# Patient Record
Sex: FEMALE | Race: WHITE | NOT HISPANIC OR LATINO | Employment: FULL TIME | URBAN - METROPOLITAN AREA
[De-identification: names, ages, dates, MRNs, and addresses within clinical notes are randomized per-mention and may not be internally consistent; named-entity substitution may affect disease eponyms.]

---

## 2024-04-09 ENCOUNTER — HOSPITAL ENCOUNTER (OUTPATIENT)
Dept: RADIOLOGY | Facility: HOSPITAL | Age: 34
Discharge: HOME | End: 2024-04-09
Attending: FAMILY MEDICINE
Payer: COMMERCIAL

## 2024-04-09 ENCOUNTER — TELEPHONE (OUTPATIENT)
Dept: URGENT CARE | Facility: CLINIC | Age: 34
End: 2024-04-09

## 2024-04-09 ENCOUNTER — OFFICE VISIT (OUTPATIENT)
Dept: URGENT CARE | Facility: CLINIC | Age: 34
End: 2024-04-09
Payer: COMMERCIAL

## 2024-04-09 VITALS
HEART RATE: 98 BPM | OXYGEN SATURATION: 98 % | RESPIRATION RATE: 20 BRPM | WEIGHT: 180 LBS | SYSTOLIC BLOOD PRESSURE: 120 MMHG | TEMPERATURE: 98 F | BODY MASS INDEX: 31.89 KG/M2 | HEIGHT: 63 IN | DIASTOLIC BLOOD PRESSURE: 83 MMHG

## 2024-04-09 DIAGNOSIS — Z87.42 HISTORY OF OVARIAN CYST: ICD-10-CM

## 2024-04-09 DIAGNOSIS — R10.30 LOWER ABDOMINAL PAIN: ICD-10-CM

## 2024-04-09 DIAGNOSIS — R10.30 LOWER ABDOMINAL PAIN: Primary | ICD-10-CM

## 2024-04-09 LAB
B-HCG UR QL: NEGATIVE
BILIRUB UR QL STRIP: NEGATIVE
CTP QC/QA: YES
GLUCOSE UR QL STRIP: NEGATIVE
KETONES UR QL STRIP: NEGATIVE
LEUKOCYTE ESTERASE UR QL STRIP: NEGATIVE
PH, POC UA: 7 (ref 5–8)
POC BLOOD, URINE: NEGATIVE
POC NITRATES, URINE: NEGATIVE
PROT UR QL STRIP: NEGATIVE
SP GR UR STRIP: 1.01 (ref 1–1.03)
UROBILINOGEN UR STRIP-ACNC: NORMAL (ref 0.1–1.1)

## 2024-04-09 PROCEDURE — 81025 URINE PREGNANCY TEST: CPT | Mod: S$GLB,,, | Performed by: FAMILY MEDICINE

## 2024-04-09 PROCEDURE — 81003 URINALYSIS AUTO W/O SCOPE: CPT | Mod: QW,S$GLB,, | Performed by: FAMILY MEDICINE

## 2024-04-09 PROCEDURE — 76856 US EXAM PELVIC COMPLETE: CPT | Mod: TC

## 2024-04-09 PROCEDURE — 99214 OFFICE O/P EST MOD 30 MIN: CPT | Mod: 25,S$GLB,, | Performed by: FAMILY MEDICINE

## 2024-04-09 PROCEDURE — 76830 TRANSVAGINAL US NON-OB: CPT | Mod: 26,,, | Performed by: RADIOLOGY

## 2024-04-09 PROCEDURE — 96372 THER/PROPH/DIAG INJ SC/IM: CPT | Mod: S$GLB,,, | Performed by: FAMILY MEDICINE

## 2024-04-09 PROCEDURE — 76856 US EXAM PELVIC COMPLETE: CPT | Mod: 26,,, | Performed by: RADIOLOGY

## 2024-04-09 RX ORDER — KETOROLAC TROMETHAMINE 30 MG/ML
30 INJECTION, SOLUTION INTRAMUSCULAR; INTRAVENOUS
Status: COMPLETED | OUTPATIENT
Start: 2024-04-09 | End: 2024-04-09

## 2024-04-09 RX ORDER — FEXOFENADINE HCL AND PSEUDOEPHEDRINE HCI 180; 240 MG/1; MG/1
1 TABLET, EXTENDED RELEASE ORAL DAILY
COMMUNITY

## 2024-04-09 RX ADMIN — KETOROLAC TROMETHAMINE 30 MG: 30 INJECTION, SOLUTION INTRAMUSCULAR; INTRAVENOUS at 10:04

## 2024-04-09 NOTE — TELEPHONE ENCOUNTER
Pt notified of pelvic US results. She is more uncomfortable after the US but is not n/v or chills. She has an appointment with the OBGN tomorrow and I encouraged her to follow with them for definite care. If she would get worse tonight then she is to proceed to the ER

## 2024-04-09 NOTE — PROGRESS NOTES
"Subjective:      Patient ID: Effie Govea is a 33 y.o. female.    Vitals:  height is 5' 3" (1.6 m) and weight is 81.6 kg (180 lb). Her oral temperature is 98.2 °F (36.8 °C). Her blood pressure is 120/83 and her pulse is 98. Her respiration is 20 and oxygen saturation is 98%.     Chief Complaint: Abdominal Pain    Patient presents with abdominal pain for 4 days. Progressively getting worse. Pain is located in the lower abdomen both right and left- worse on the right at the moment. She denies constipation or diarrhea or change in bowel habits. Denies nausea or vomiting or chills, etc. Pt states she has had the pain in the past many years ago when she had an ovarian cyst. She was on an IUD for a while but it got implanted in her uterus and she had to have it removed. She was then treated with OCs until last November when she moved to  and decided to stop the OCs due to other side effects. She has not been sexually active since February. She  Had very painful menses last month . She states in the past they thought perhaps she had endometriosis but it was not confirmed. Symptoms were controlled when on hormone therapy     Abdominal Pain  This is a new problem. Episode onset: 4 days. The onset quality is gradual. The problem occurs constantly. The problem has been gradually worsening. The pain is located in the generalized abdominal region, LLQ and LUQ (soreness to left side sharp pain to right). The pain is at a severity of 8/10. The pain is severe. The quality of the pain is sharp. The abdominal pain radiates to the RUQ and RLQ. Associated symptoms comments: No appetite. The pain is aggravated by movement. The pain is relieved by Nothing. Treatments tried: ibruprofen. The treatment provided no relief.       Gastrointestinal:  Positive for abdominal pain.      Objective:     Physical Exam   Constitutional: She is oriented to person, place, and time. No distress (until palpation on abdomen which causes acute severe pain)). "   HENT:   Head: Normocephalic.   Cardiovascular: Normal rate and regular rhythm.   Pulmonary/Chest: Effort normal and breath sounds normal.   Abdominal: Normal appearance and bowel sounds are normal. She exhibits no distension. Soft. flat abdomen There is abdominal tenderness (acute ttp in rt and lt LQ-worse on the rt). There is no rebound, no guarding, no left CVA tenderness and no right CVA tenderness.   Neurological: no focal deficit. She is alert and oriented to person, place, and time.   Skin: Skin is warm and dry.   Psychiatric: Her behavior is normal. Judgment and thought content normal.   Nursing note and vitals reviewed.      Assessment:     1. Lower abdominal pain    2. History of ovarian cyst        Plan:       Lower abdominal pain  -     POCT Urinalysis, Dipstick, Automated, W/O Scope  -     POCT urine pregnancy  -     ketorolac injection 30 mg  -     US Pelvis Complete Non OB; Future; Expected date: 04/09/2024    History of ovarian cyst    Pt or guardian provided educational materials and instructions regarding their visit diagnosis.

## 2024-04-10 ENCOUNTER — OFFICE VISIT (OUTPATIENT)
Dept: OBSTETRICS AND GYNECOLOGY | Facility: CLINIC | Age: 34
End: 2024-04-10
Payer: COMMERCIAL

## 2024-04-10 VITALS
DIASTOLIC BLOOD PRESSURE: 66 MMHG | WEIGHT: 181.44 LBS | SYSTOLIC BLOOD PRESSURE: 112 MMHG | HEIGHT: 63 IN | BODY MASS INDEX: 32.15 KG/M2

## 2024-04-10 DIAGNOSIS — N94.6 DYSMENORRHEA: Primary | ICD-10-CM

## 2024-04-10 DIAGNOSIS — R10.2 PELVIC PAIN: ICD-10-CM

## 2024-04-10 PROCEDURE — 3008F BODY MASS INDEX DOCD: CPT | Mod: CPTII,S$GLB,, | Performed by: OBSTETRICS & GYNECOLOGY

## 2024-04-10 PROCEDURE — 99203 OFFICE O/P NEW LOW 30 MIN: CPT | Mod: S$GLB,,, | Performed by: OBSTETRICS & GYNECOLOGY

## 2024-04-10 PROCEDURE — 1159F MED LIST DOCD IN RCRD: CPT | Mod: CPTII,S$GLB,, | Performed by: OBSTETRICS & GYNECOLOGY

## 2024-04-10 PROCEDURE — 3074F SYST BP LT 130 MM HG: CPT | Mod: CPTII,S$GLB,, | Performed by: OBSTETRICS & GYNECOLOGY

## 2024-04-10 PROCEDURE — 3078F DIAST BP <80 MM HG: CPT | Mod: CPTII,S$GLB,, | Performed by: OBSTETRICS & GYNECOLOGY

## 2024-04-10 PROCEDURE — 99999 PR PBB SHADOW E&M-EST. PATIENT-LVL III: CPT | Mod: PBBFAC,,, | Performed by: OBSTETRICS & GYNECOLOGY

## 2024-04-10 RX ORDER — LEVONORGESTREL / ETHINYL ESTRADIOL AND ETHINYL ESTRADIOL 150-30(84)
1 KIT ORAL DAILY
Qty: 84 EACH | Refills: 4 | Status: SHIPPED | OUTPATIENT
Start: 2024-04-10 | End: 2025-04-10

## 2024-04-10 NOTE — PROGRESS NOTES
Gynecology    SUBJECTIVE:     Chief Complaint: Pelvic Discomfort       History of Present Illness:  33 year old who presents with new pelvic pain.  Reports finishe dher period on 3/28 so was having cramps.  Then started to have severe pain in the middle of the night Sunday night.  She then stayed home Monday and took ibuprofen and laid around all day.  Used a heating pad.  She was having pain in general, but was also having worsening pain with her bowel movements as well.  Tuesday morning, her pain started to move toward the right hand side.  She then went to urgent care.  Ultrasound was performed and the patient was in tears during the abdominal portion with pressure on the right and vaginally.  Ultrasound results below.  Today, patient reports that the pain is improved, but she still has some tenderness with moving around.  Wearing loose work pants to avoid any pressure.      No fever or chills.  Has taken her temperature and it was normal.  No dysuria, urgency or frequency.  Normal bowel movements.  Decreased appetite but no nausea or vomiting.  Period was painful, but not out of the ordinary for her.  Typically her cycles lasts 4 days but the first two days are very painful and heavy with clots.  She has a history of endometriosis.      Of note, this type of painful event is not new for her.  It does not necessarily trend with her cycle.  Does have endometriosis diagnosed with laparoscopy at age 17.  Has been using contraception on and off for years.      Prior to covid, she tried several options to treat endometriosis, periods and blood clots.  Has also had ovarian cysts as well.  She did try mirena, but still had monthly cycles.  It improved somewhat with pain, but still had her period.  Had pain with removal (was told it was imbedded).  She also tried nuvaring and other OCPs and always had a period.  Has never tried menstrual suppression.      Has never had an abnormal pap smear.  Has had five colonoscopies  in the past to look for IBS.      Review of Systems:  Review of Systems   Constitutional:  Positive for appetite change. Negative for fever and unexpected weight change.   Gastrointestinal:  Positive for abdominal pain. Negative for constipation, diarrhea, nausea and vomiting.   Genitourinary:  Positive for dysmenorrhea and pelvic pain. Negative for dysuria, frequency, menorrhagia, menstrual problem, vaginal bleeding, vaginal discharge and vaginal pain.        OBJECTIVE:     Physical Exam:  Physical Exam  Vitals and nursing note reviewed.   Constitutional:       Appearance: She is well-developed.   Pulmonary:      Effort: Pulmonary effort is normal.   Abdominal:      Palpations: Abdomen is soft.   Genitourinary:     Labia:         Right: No rash, tenderness, lesion or injury.         Left: No rash, tenderness, lesion or injury.       Vagina: Normal. No signs of injury and foreign body. No vaginal discharge, erythema, tenderness or bleeding.      Cervix: No cervical motion tenderness, discharge or friability.      Uterus: Tender. Not deviated, not enlarged and not fixed.       Adnexa:         Right: No mass, tenderness or fullness.          Left: No mass, tenderness or fullness.        Comments: Urethra: normal appearing urethra with no masses, tenderness or lesions  Urethral meatus: normal size, anterior vaginal wall with no prolapse, no lesions  Neurological:      Mental Status: She is alert and oriented to person, place, and time.   Psychiatric:         Behavior: Behavior normal.         Thought Content: Thought content normal.         Judgment: Judgment normal.         Chaperoned by: Ruth    ASSESSMENT:       ICD-10-CM ICD-9-CM    1. Dysmenorrhea  N94.6 625.3 L norgest/e.estradioL-e.estrad (SEASONIQUE) 0.15 mg-30 mcg (84)/10 mcg (7) 3MPk      2. Pelvic pain  R10.2 [id number]              Plan:      Effie was seen today for pelvic discomfort.    Diagnoses and all orders for this visit:    Dysmenorrhea  -     L  norgest/e.estradioL-e.estrad (SEASONIQUE) 0.15 mg-30 mcg (84)/10 mcg (7) 3MPk; Take 1 tablet by mouth once daily. Skip placebo pills, take continuously.    Pelvic pain    Pelvic pain:  - unsure the cause, but it is improving, normal ultrasound, and low suspicion for PID on exam (no CMT); antibiotics not warranted at this time; no ovarian cyst or torsion; urine dipstick normal yesterday; patient encouraged to notify me if no improvement or worsening symptoms over the next few days.  If pain increases, could consider antibiotics.    Dysmenorrhea:  - discussed options to treat dysmenorrhea/endometriosis; patient not interested in another IUD; has never tried menstrual suppression; doesn't do well with NSAIDs due to an esophagus issue; will try continuous OCPs; discussed breakthrough bleeding and how to resolved;    No orders of the defined types were placed in this encounter.        Andreea Jay

## 2024-08-27 ENCOUNTER — OFFICE VISIT (OUTPATIENT)
Dept: URGENT CARE | Facility: CLINIC | Age: 34
End: 2024-08-27
Payer: COMMERCIAL

## 2024-08-27 VITALS
HEART RATE: 73 BPM | HEIGHT: 63 IN | DIASTOLIC BLOOD PRESSURE: 84 MMHG | SYSTOLIC BLOOD PRESSURE: 130 MMHG | BODY MASS INDEX: 31.89 KG/M2 | TEMPERATURE: 98 F | RESPIRATION RATE: 17 BRPM | OXYGEN SATURATION: 99 % | WEIGHT: 180 LBS

## 2024-08-27 DIAGNOSIS — R09.81 SINUS CONGESTION: ICD-10-CM

## 2024-08-27 DIAGNOSIS — U07.1 COVID-19: Primary | ICD-10-CM

## 2024-08-27 LAB
CTP QC/QA: YES
SARS-COV-2 AG RESP QL IA.RAPID: POSITIVE

## 2024-08-27 PROCEDURE — 87811 SARS-COV-2 COVID19 W/OPTIC: CPT | Mod: QW,S$GLB,, | Performed by: NURSE PRACTITIONER

## 2024-08-27 PROCEDURE — 99213 OFFICE O/P EST LOW 20 MIN: CPT | Mod: S$GLB,,, | Performed by: NURSE PRACTITIONER

## 2024-08-27 NOTE — LETTER
[address] [facility] [address]  Phone: [phone]  Fax: [phone]          Return to Work/School    Patient: Effie Govea  YOB: 1990   Date: 08/27/2024     To Whom It May Concern:     Effie Govea was in contact with/seen in my office on 08/27/2024. COVID-19 is present in our communities across the state.     Effie Govea has met the criteria for COVID-19 testing and has a POSITIVE result. She can return to work once they are asymptomatic for 24 hours without the use of fever reducing medications.   If you have any questions or concerns, or if I can be of further assistance, please do not hesitate to contact me.     Sincerely,    Janiya Miller, NP

## 2024-08-27 NOTE — PROGRESS NOTES
"Subjective:      Patient ID: Effie Govea is a 34 y.o. female.    Vitals:  height is 5' 3" (1.6 m) and weight is 81.6 kg (180 lb). Her tympanic temperature is 97.8 °F (36.6 °C). Her blood pressure is 130/84 and her pulse is 73. Her respiration is 17 and oxygen saturation is 99%.     Chief Complaint: Nasal Congestion (Entered by patient)    Patient presents with c.o sinus congestion for three days. Patient also with sinus pressure and cough. Denies chills, fever or body aches. Agrees to a covid test in clinic. Flonase has not helped much.     Provider note begins below    Ill contacts.  She works in a restaurant in hotel.  Denies any fevers aches.  Reports thick mucus.    Sinus Problem  This is a new problem. Episode onset: 3 days. The problem is unchanged. There has been no fever. Associated symptoms include congestion, coughing, headaches and sinus pressure. Pertinent negatives include no chills or diaphoresis. Treatments tried: ibuprofen, flonase. The treatment provided no relief.       Constitution: Negative for chills, sweating, fatigue and fever.   HENT:  Positive for congestion and sinus pressure.    Cardiovascular:  Negative for chest pain and sob on exertion.   Respiratory:  Positive for cough and sputum production.    Gastrointestinal:  Negative for nausea, vomiting, constipation and diarrhea.   Neurological:  Positive for headaches.      Objective:     Physical Exam   Constitutional: She is oriented to person, place, and time.   HENT:   Head: Normocephalic and atraumatic.   Cardiovascular: Normal rate and regular rhythm.   Pulmonary/Chest: Effort normal and breath sounds normal. No stridor. No respiratory distress. She has no wheezes. She has no rhonchi. She has no rales. She exhibits no tenderness.   Abdominal: Normal appearance.   Neurological: She is alert and oriented to person, place, and time.   Skin: Skin is warm and dry.   Psychiatric: Her behavior is normal. Mood normal.         Results for orders " placed or performed in visit on 08/27/24   SARS Coronavirus 2 Antigen, POCT Manual Read   Result Value Ref Range    SARS Coronavirus 2 Antigen Positive (A) Negative     Acceptable Yes       Assessment:     1. COVID-19    2. Sinus congestion        Plan:   You tested positive for COVID today     1     The CDC has changed their approach to COVID isolation to be in line with other respiratory viruses.         If you test positive for COVID-19 you may return to normal activities when, for at least 24 hours, both are true:     Your symptoms are getting better overall, and:  You have not had a fever AND are not using fever reducing medication    If you are ill with COVID-19, wear a mask for 10 days, where day 1 is when symptoms started. If you tested positive, but never had symptoms, day 1 is the date of positive test.     The CDC also recommends added precautions in the 5 days after return to normal activity including frequent hand washing, mask wearing, physical distancing.         COVID-19    Sinus congestion  -     SARS Coronavirus 2 Antigen, POCT Manual Read

## 2024-08-27 NOTE — PATIENT INSTRUCTIONS
The CDC has changed their approach to COVID isolation to be in line with other respiratory viruses.         If you test positive for COVID-19 you may return to normal activities when, for at least 24 hours, both are true:     Your symptoms are getting better overall, and:  You have not had a fever AND are not using fever reducing medication    If you are ill with COVID-19, wear a mask for 10 days, where day 1 is when symptoms started. If you tested positive, but never had symptoms, day 1 is the date of positive test.     The CDC also recommends added precautions in the 5 days after return to normal activity including frequent hand washing, mask wearing, physical distancing.     Mucinex for thick mucus and Sudafed for sinus congestion

## 2024-09-19 ENCOUNTER — OFFICE VISIT (OUTPATIENT)
Dept: OBSTETRICS AND GYNECOLOGY | Facility: CLINIC | Age: 34
End: 2024-09-19
Payer: COMMERCIAL

## 2024-09-19 ENCOUNTER — PATIENT MESSAGE (OUTPATIENT)
Dept: OBSTETRICS AND GYNECOLOGY | Facility: CLINIC | Age: 34
End: 2024-09-19

## 2024-09-19 VITALS
BODY MASS INDEX: 31.13 KG/M2 | DIASTOLIC BLOOD PRESSURE: 86 MMHG | SYSTOLIC BLOOD PRESSURE: 124 MMHG | WEIGHT: 175.69 LBS | HEIGHT: 63 IN

## 2024-09-19 DIAGNOSIS — N80.9 ENDOMETRIOSIS: Primary | ICD-10-CM

## 2024-09-19 PROCEDURE — 3074F SYST BP LT 130 MM HG: CPT | Mod: CPTII,S$GLB,, | Performed by: OBSTETRICS & GYNECOLOGY

## 2024-09-19 PROCEDURE — 1159F MED LIST DOCD IN RCRD: CPT | Mod: CPTII,S$GLB,, | Performed by: OBSTETRICS & GYNECOLOGY

## 2024-09-19 PROCEDURE — 99215 OFFICE O/P EST HI 40 MIN: CPT | Mod: S$GLB,,, | Performed by: OBSTETRICS & GYNECOLOGY

## 2024-09-19 PROCEDURE — 3008F BODY MASS INDEX DOCD: CPT | Mod: CPTII,S$GLB,, | Performed by: OBSTETRICS & GYNECOLOGY

## 2024-09-19 PROCEDURE — 3079F DIAST BP 80-89 MM HG: CPT | Mod: CPTII,S$GLB,, | Performed by: OBSTETRICS & GYNECOLOGY

## 2024-09-19 PROCEDURE — 99999 PR PBB SHADOW E&M-EST. PATIENT-LVL III: CPT | Mod: PBBFAC,,, | Performed by: OBSTETRICS & GYNECOLOGY

## 2024-09-19 NOTE — PROGRESS NOTES
Gynecology    SUBJECTIVE:     Chief Complaint: Dysmenorrhea       History of Present Illness:  34 year old who returns to discuss dysmenorrhea/endometriosis.  The patient was seen previously with severe pain with her period.  Her ultrasound was normal.  She was started on seasonale for menstrual suppression.     She started the pills in April.  She then had spotting in June and some funny discharge.  She stopped the pills for 7 days to have a period and then restarted.  She is currently on week 8 of her new pack.  She is having cramping, no bleeding.  She is having unusual discharge.  She reports this is occurring every few weeks.  She tried to go running this morning, but then had to stop because of lower abdominal discomfort.  She also has pain for two days after intercourse.      Does have regular bowel movements in the morning.  Has pain with bowel movements as well.  But her bowel movements are of normal consistency.      She previously used the IUD (removed one year ago, had issues with it implanting into the wall + partner had a vasectomy), then had no contraception for about 8 months.  During that time, her pain with the periods has gotten progressively worse.  Tends to be the week before her cycle and during her cycle.  Does have a history of laparoscpy that demonstrated endometriosis.    Review of Systems:  Review of Systems   Genitourinary:  Positive for dysmenorrhea and dyspareunia. Negative for menorrhagia, menstrual problem, pelvic pain, vaginal bleeding, vaginal discharge, vaginal pain and vaginal odor.        Pain with bowel movements        OBJECTIVE:     Physical Exam:  Physical Exam  Vitals and nursing note reviewed.   Constitutional:       Appearance: She is well-developed.   Pulmonary:      Effort: Pulmonary effort is normal.   Skin:     Coloration: Skin is not pale.   Neurological:      Mental Status: She is oriented to person, place, and time.   Psychiatric:         Behavior: Behavior normal.          Thought Content: Thought content normal.         Judgment: Judgment normal.         ASSESSMENT:       ICD-10-CM ICD-9-CM    1. Endometriosis  N80.9 617.9 elagolix 150 mg Tab             Plan:      Novel was seen today for dysmenorrhea.    Diagnoses and all orders for this visit:    Endometriosis  -     elagolix 150 mg Tab; Take 150 mg by mouth once daily.    41 minutes of total time was spent on the encounter which included face-to-face time and non-face-to-face time preparing to see the patient, obtaining and or reviewing   separately obtained history, documenting clinical information in the electronic or other health record, independently interpreting results (not separately reported) and   communicating results to the patient, or care coordination (not separately reported).    - patient has surgically diagnosed endometriosis and has failed continuous OCPs as well as IUD treatment; she doesn't tolerate NSAIDs due to GI upset  - discussed orlissa vs diagnostic laparoscopy vs hysterectomy  - patient is interested in orlissa; reviewed risks/benefits of the medication; advised to stop her current OCP; she is not sexually active, but advised to use contraception in the event that she becomes sexually active.   - follow up in 3 months to check on how she is doing.    No orders of the defined types were placed in this encounter.        Andreea Jay

## 2024-11-27 ENCOUNTER — PATIENT MESSAGE (OUTPATIENT)
Dept: OBSTETRICS AND GYNECOLOGY | Facility: CLINIC | Age: 34
End: 2024-11-27
Payer: COMMERCIAL

## 2025-03-26 ENCOUNTER — OFFICE VISIT (OUTPATIENT)
Dept: URGENT CARE | Facility: CLINIC | Age: 35
End: 2025-03-26
Payer: COMMERCIAL

## 2025-03-26 VITALS
BODY MASS INDEX: 31.01 KG/M2 | HEIGHT: 63 IN | SYSTOLIC BLOOD PRESSURE: 134 MMHG | DIASTOLIC BLOOD PRESSURE: 88 MMHG | RESPIRATION RATE: 20 BRPM | OXYGEN SATURATION: 99 % | WEIGHT: 175 LBS | HEART RATE: 84 BPM | TEMPERATURE: 98 F

## 2025-03-26 DIAGNOSIS — J02.9 SORE THROAT: ICD-10-CM

## 2025-03-26 DIAGNOSIS — J02.0 STREP PHARYNGITIS: Primary | ICD-10-CM

## 2025-03-26 DIAGNOSIS — R06.00 DYSPNEA, UNSPECIFIED TYPE: ICD-10-CM

## 2025-03-26 LAB
CTP QC/QA: YES
MOLECULAR STREP A: POSITIVE

## 2025-03-26 PROCEDURE — 96372 THER/PROPH/DIAG INJ SC/IM: CPT | Mod: S$GLB,,, | Performed by: NURSE PRACTITIONER

## 2025-03-26 PROCEDURE — 99213 OFFICE O/P EST LOW 20 MIN: CPT | Mod: 25,S$GLB,, | Performed by: NURSE PRACTITIONER

## 2025-03-26 PROCEDURE — 87651 STREP A DNA AMP PROBE: CPT | Mod: QW,S$GLB,, | Performed by: NURSE PRACTITIONER

## 2025-03-26 RX ORDER — DEXAMETHASONE SODIUM PHOSPHATE 10 MG/ML
10 INJECTION INTRAMUSCULAR; INTRAVENOUS
Status: COMPLETED | OUTPATIENT
Start: 2025-03-26 | End: 2025-03-26

## 2025-03-26 RX ORDER — AMOXICILLIN 500 MG/1
500 TABLET, FILM COATED ORAL EVERY 12 HOURS
Qty: 20 TABLET | Refills: 0 | Status: SHIPPED | OUTPATIENT
Start: 2025-03-26 | End: 2025-04-05

## 2025-03-26 RX ADMIN — DEXAMETHASONE SODIUM PHOSPHATE 10 MG: 10 INJECTION INTRAMUSCULAR; INTRAVENOUS at 09:03

## 2025-03-26 NOTE — PROGRESS NOTES
"Subjective:      Patient ID: Effie Govea is a 34 y.o. female.    Vitals:  height is 5' 3" (1.6 m) and weight is 79.4 kg (175 lb). Her temperature is 98.1 °F (36.7 °C). Her blood pressure is 134/88 and her pulse is 84. Her respiration is 20 and oxygen saturation is 99%.     Chief Complaint: Sore Throat (Entered by patient)    Bryan is a 33 yo female presenting with complaint of sore throat, trouble breathing (related to throat).  Onset was 2-3 days ago.  Pt states she has had a sore throat since the beginning of the week and states as of last night she has been having issues breathing.  Pt states when she lays down her symptoms worsen.  Pt states she has hx of strep throat and allergies.  Pt states she has taken ibuprofen and allegra for her symptoms     Sore Throat   This is a new problem. The current episode started in the past 7 days. The problem has been gradually worsening. Neither side of throat is experiencing more pain than the other. There has been no fever. The pain is at a severity of 8/10. The pain is moderate. Associated symptoms include shortness of breath (feels like throat is swollen shut when she lays down). Pertinent negatives include no congestion, coughing, diarrhea, ear pain, headaches, trouble swallowing or vomiting. She has had no exposure to strep or mono. She has tried NSAIDs for the symptoms. The treatment provided mild relief.       Constitution: Negative for chills, fatigue and fever.   HENT:  Positive for sore throat. Negative for ear pain, congestion, sinus pain and trouble swallowing.    Cardiovascular:  Negative for chest pain.   Respiratory:  Positive for shortness of breath (feels like throat is swollen shut when she lays down). Negative for cough and sputum production.    Gastrointestinal:  Negative for nausea, vomiting and diarrhea.   Musculoskeletal:  Negative for muscle ache.   Neurological:  Negative for headaches.      Objective:     Physical Exam   Constitutional: She is oriented " to person, place, and time.   HENT:   Head: Normocephalic.   Ears:   Right Ear: Hearing and external ear normal. No no drainage, swelling or tenderness. Tympanic membrane is not erythematous, not retracted and not bulging. No middle ear effusion.   Left Ear: Hearing and external ear normal. No no drainage, swelling or tenderness. Tympanic membrane is not erythematous, not retracted and not bulging.  No middle ear effusion.   Nose: Nose normal. No mucosal edema, rhinorrhea or purulent discharge. Right sinus exhibits no maxillary sinus tenderness and no frontal sinus tenderness. Left sinus exhibits no maxillary sinus tenderness and no frontal sinus tenderness.   Mouth/Throat: Uvula is midline and mucous membranes are normal. No trismus in the jaw. No uvula swelling. Posterior oropharyngeal edema and posterior oropharyngeal erythema present. No oropharyngeal exudate. Tonsils are 3+ on the right. Tonsils are 3+ on the left. No tonsillar exudate.   Cardiovascular: Normal rate and regular rhythm.   Pulmonary/Chest: Effort normal and breath sounds normal. No accessory muscle usage or stridor. No respiratory distress. She has no decreased breath sounds. She has no wheezes. She has no rhonchi. She has no rales.   Neurological: She is alert and oriented to person, place, and time.   Skin: Skin is warm and dry.   Nursing note and vitals reviewed.      Assessment:     1. Strep pharyngitis    2. Sore throat    3. Dyspnea, unspecified type        Plan:       Strep pharyngitis  -     amoxicillin (AMOXIL) 500 MG Tab; Take 1 tablet (500 mg total) by mouth every 12 (twelve) hours. for 10 days  Dispense: 20 tablet; Refill: 0    Sore throat  -     POCT Strep A, Molecular    Dyspnea, unspecified type  -     dexAMETHasone injection 10 mg      Patient Instructions     Sore throat  -     POCT Strep A, Molecular    Dyspnea, unspecified type  -     dexAMETHasone injection 10 mg - given in clinic @ 9:20 pm    Strep pharyngitis    -      "amoxicillin (AMOXIL) 500 MG Tab; Take 1 tablet (500 mg total) by mouth every 12 (twelve) hours. for 10 days  Dispense: 20 tablet; Refill: 0      You have had a positive test for strep throat. Strep throat is a contagious illness. It is spread by coughing, kissing or by touching others after touching your mouth or nose. Symptoms include throat pain that is worse with swallowing, aching all over, headache, and fever. It is treated with antibiotic medicine. This should help you start to feel better in 1 to 2 days.  Take antibiotic medicine for the full 10 days, even if you feel better. This is very important to ensure the infection is treated. It is also important to prevent medicine-resistant germs from developing.        - Change toothbrush after resolution of symptoms and completion of antibiotic therapy    - Rest at home.     - Drink plenty of fluids so you won't get dehydrated.    - Fever/Pain recommendations:  Alternate Tylenol or Motrin every 4 - 6 hours as needed for fever, pain or fussiness.    -Sore throat recommendations: Warm fluids, soup, or drinking something cold or frozen.    -Congestion recommendations: Over-the-counter Children's Mucinex or over the counter Saline Nasal Spray or Flonase Kids as directed for nasal congestion.  Saline Nasal Spray with bulb suction as needed for nasal congestion; perform during the day and especially before eating and bedtime    -Cough recommendations: Over-the-counter Children's Delsym       Avoid taking Decongestants such as pseudoephedrine (ex. Sudafed) or phenylephrine (ex. Mucinex FastMax, Dayquil, Nyquil, or any combo cold meds that say "cold," "sinus" or "-D").        Returning to work/school:  No work or school for the first 2 days of taking the antibiotics. After this time, you will not be contagious. You can then return to school or work if you are feeling better.     Follow up with your Pediatrician in the next 48hrs or sooner for re-eval especially if no " improvement in symptoms    When to seek medical advice  Call your healthcare provider right away if any of these occur:  Fever that is poorly controlled with OTC fever reducing medication  New or worsening ear pain, sinus pain, or headache  Stiff neck  You can't swallow liquids or you can't open your mouth wide because of throat pain  Signs of dehydration. These include very dark urine or no urine, sunken eyes, and dizziness.  Trouble breathing or noisy breathing  Muffled voice  Rash

## 2025-03-26 NOTE — PATIENT INSTRUCTIONS
"  Sore throat  -     POCT Strep A, Molecular    Dyspnea, unspecified type  -     dexAMETHasone injection 10 mg - given in clinic @ 9:20 pm    Strep pharyngitis    -     amoxicillin (AMOXIL) 500 MG Tab; Take 1 tablet (500 mg total) by mouth every 12 (twelve) hours. for 10 days  Dispense: 20 tablet; Refill: 0      You have had a positive test for strep throat. Strep throat is a contagious illness. It is spread by coughing, kissing or by touching others after touching your mouth or nose. Symptoms include throat pain that is worse with swallowing, aching all over, headache, and fever. It is treated with antibiotic medicine. This should help you start to feel better in 1 to 2 days.  Take antibiotic medicine for the full 10 days, even if you feel better. This is very important to ensure the infection is treated. It is also important to prevent medicine-resistant germs from developing.        - Change toothbrush after resolution of symptoms and completion of antibiotic therapy    - Rest at home.     - Drink plenty of fluids so you won't get dehydrated.    - Fever/Pain recommendations:  Alternate Tylenol or Motrin every 4 - 6 hours as needed for fever, pain or fussiness.    -Sore throat recommendations: Warm fluids, soup, or drinking something cold or frozen.    -Congestion recommendations: Over-the-counter Children's Mucinex or over the counter Saline Nasal Spray or Flonase Kids as directed for nasal congestion.  Saline Nasal Spray with bulb suction as needed for nasal congestion; perform during the day and especially before eating and bedtime    -Cough recommendations: Over-the-counter Children's Delsym       Avoid taking Decongestants such as pseudoephedrine (ex. Sudafed) or phenylephrine (ex. Mucinex FastMax, Dayquil, Nyquil, or any combo cold meds that say "cold," "sinus" or "-D").        Returning to work/school:  No work or school for the first 2 days of taking the antibiotics. After this time, you will not be " contagious. You can then return to school or work if you are feeling better.     Follow up with your Pediatrician in the next 48hrs or sooner for re-eval especially if no improvement in symptoms    When to seek medical advice  Call your healthcare provider right away if any of these occur:  Fever that is poorly controlled with OTC fever reducing medication  New or worsening ear pain, sinus pain, or headache  Stiff neck  You can't swallow liquids or you can't open your mouth wide because of throat pain  Signs of dehydration. These include very dark urine or no urine, sunken eyes, and dizziness.  Trouble breathing or noisy breathing  Muffled voice  Rash

## 2025-04-20 ENCOUNTER — HOSPITAL ENCOUNTER (OUTPATIENT)
Facility: OTHER | Age: 35
Discharge: HOME | End: 2025-04-22
Attending: EMERGENCY MEDICINE | Admitting: EMERGENCY MEDICINE
Payer: COMMERCIAL

## 2025-04-20 DIAGNOSIS — K52.9 ACUTE COLITIS: Primary | ICD-10-CM

## 2025-04-20 DIAGNOSIS — R10.31 RIGHT LOWER QUADRANT ABDOMINAL PAIN: ICD-10-CM

## 2025-04-20 DIAGNOSIS — K52.9 COLITIS: ICD-10-CM

## 2025-04-20 PROBLEM — K51.00 PANCOLITIS: Status: ACTIVE | Noted: 2025-04-20

## 2025-04-20 LAB
ABSOLUTE EOSINOPHIL (OHS): 0.01 K/UL
ABSOLUTE MONOCYTE (OHS): 0.8 K/UL (ref 0.3–1)
ABSOLUTE NEUTROPHIL COUNT (OHS): 8.89 K/UL (ref 1.8–7.7)
ALBUMIN SERPL BCP-MCNC: 3.5 G/DL (ref 3.5–5.2)
ALP SERPL-CCNC: 93 UNIT/L (ref 40–150)
ALT SERPL W/O P-5'-P-CCNC: 16 UNIT/L (ref 10–44)
ANION GAP (OHS): 7 MMOL/L (ref 8–16)
AST SERPL-CCNC: 16 UNIT/L (ref 11–45)
B-HCG UR QL: NEGATIVE
BASOPHILS # BLD AUTO: 0.02 K/UL
BASOPHILS NFR BLD AUTO: 0.2 %
BILIRUB SERPL-MCNC: 0.3 MG/DL (ref 0.1–1)
BILIRUB UR QL STRIP.AUTO: ABNORMAL
BUN SERPL-MCNC: 6 MG/DL (ref 6–20)
CALCIUM SERPL-MCNC: 8.5 MG/DL (ref 8.7–10.5)
CHLORIDE SERPL-SCNC: 106 MMOL/L (ref 95–110)
CLARITY UR: CLEAR
CO2 SERPL-SCNC: 21 MMOL/L (ref 23–29)
COLOR UR AUTO: YELLOW
CREAT SERPL-MCNC: 0.8 MG/DL (ref 0.5–1.4)
CTP QC/QA: YES
ERYTHROCYTE [DISTWIDTH] IN BLOOD BY AUTOMATED COUNT: 17 % (ref 11.5–14.5)
GFR SERPLBLD CREATININE-BSD FMLA CKD-EPI: >60 ML/MIN/1.73/M2
GLUCOSE SERPL-MCNC: 114 MG/DL (ref 70–110)
GLUCOSE UR QL STRIP: NEGATIVE
HCT VFR BLD AUTO: 35.8 % (ref 37–48.5)
HGB BLD-MCNC: 11.9 GM/DL (ref 12–16)
HGB UR QL STRIP: ABNORMAL
HOLD SPECIMEN: NORMAL
IMM GRANULOCYTES # BLD AUTO: 0.03 K/UL (ref 0–0.04)
IMM GRANULOCYTES NFR BLD AUTO: 0.3 % (ref 0–0.5)
KETONES UR QL STRIP: ABNORMAL
LEUKOCYTE ESTERASE UR QL STRIP: NEGATIVE
LIPASE SERPL-CCNC: 63 U/L (ref 4–60)
LYMPHOCYTES # BLD AUTO: 1.09 K/UL (ref 1–4.8)
MCH RBC QN AUTO: 28.4 PG (ref 27–31)
MCHC RBC AUTO-ENTMCNC: 33.2 G/DL (ref 32–36)
MCV RBC AUTO: 85 FL (ref 82–98)
NITRITE UR QL STRIP: NEGATIVE
NUCLEATED RBC (/100WBC) (OHS): 0 /100 WBC
PH UR STRIP: 6 [PH]
PLATELET # BLD AUTO: 213 K/UL (ref 150–450)
PMV BLD AUTO: 10.1 FL (ref 9.2–12.9)
POTASSIUM SERPL-SCNC: 3.3 MMOL/L (ref 3.5–5.1)
PROT SERPL-MCNC: 7.2 GM/DL (ref 6–8.4)
PROT UR QL STRIP: ABNORMAL
RBC # BLD AUTO: 4.19 M/UL (ref 4–5.4)
RELATIVE EOSINOPHIL (OHS): 0.1 %
RELATIVE LYMPHOCYTE (OHS): 10.1 % (ref 18–48)
RELATIVE MONOCYTE (OHS): 7.4 % (ref 4–15)
RELATIVE NEUTROPHIL (OHS): 81.9 % (ref 38–73)
SODIUM SERPL-SCNC: 134 MMOL/L (ref 136–145)
SP GR UR STRIP: >=1.03
UROBILINOGEN UR STRIP-ACNC: NEGATIVE EU/DL
WBC # BLD AUTO: 10.84 K/UL (ref 3.9–12.7)

## 2025-04-20 PROCEDURE — G0378 HOSPITAL OBSERVATION PER HR: HCPCS

## 2025-04-20 PROCEDURE — 25000003 PHARM REV CODE 250

## 2025-04-20 PROCEDURE — 85025 COMPLETE CBC W/AUTO DIFF WBC: CPT | Performed by: EMERGENCY MEDICINE

## 2025-04-20 PROCEDURE — 25500020 PHARM REV CODE 255: Performed by: EMERGENCY MEDICINE

## 2025-04-20 PROCEDURE — 83690 ASSAY OF LIPASE: CPT | Performed by: EMERGENCY MEDICINE

## 2025-04-20 PROCEDURE — 80053 COMPREHEN METABOLIC PANEL: CPT | Performed by: EMERGENCY MEDICINE

## 2025-04-20 PROCEDURE — 81003 URINALYSIS AUTO W/O SCOPE: CPT | Performed by: EMERGENCY MEDICINE

## 2025-04-20 PROCEDURE — 25000003 PHARM REV CODE 250: Performed by: EMERGENCY MEDICINE

## 2025-04-20 PROCEDURE — 87324 CLOSTRIDIUM AG IA: CPT | Performed by: EMERGENCY MEDICINE

## 2025-04-20 PROCEDURE — 81025 URINE PREGNANCY TEST: CPT | Performed by: EMERGENCY MEDICINE

## 2025-04-20 PROCEDURE — 63600175 PHARM REV CODE 636 W HCPCS: Mod: JZ,TB

## 2025-04-20 PROCEDURE — 63600175 PHARM REV CODE 636 W HCPCS: Performed by: EMERGENCY MEDICINE

## 2025-04-20 PROCEDURE — 63600175 PHARM REV CODE 636 W HCPCS: Mod: JZ,TB | Performed by: EMERGENCY MEDICINE

## 2025-04-20 PROCEDURE — 63600175 PHARM REV CODE 636 W HCPCS: Performed by: NURSE PRACTITIONER

## 2025-04-20 RX ORDER — CIPROFLOXACIN 2 MG/ML
400 INJECTION, SOLUTION INTRAVENOUS
Status: DISCONTINUED | OUTPATIENT
Start: 2025-04-20 | End: 2025-04-22 | Stop reason: HOSPADM

## 2025-04-20 RX ORDER — KETOROLAC TROMETHAMINE 30 MG/ML
10 INJECTION, SOLUTION INTRAMUSCULAR; INTRAVENOUS
Status: COMPLETED | OUTPATIENT
Start: 2025-04-20 | End: 2025-04-20

## 2025-04-20 RX ORDER — ACETAMINOPHEN 500 MG
1000 TABLET ORAL
Status: COMPLETED | OUTPATIENT
Start: 2025-04-20 | End: 2025-04-20

## 2025-04-20 RX ORDER — CIPROFLOXACIN 2 MG/ML
400 INJECTION, SOLUTION INTRAVENOUS
Status: COMPLETED | OUTPATIENT
Start: 2025-04-20 | End: 2025-04-20

## 2025-04-20 RX ORDER — POTASSIUM CHLORIDE 20 MEQ/1
40 TABLET, EXTENDED RELEASE ORAL
Status: ACTIVE | OUTPATIENT
Start: 2025-04-20 | End: 2025-04-21

## 2025-04-20 RX ORDER — METRONIDAZOLE 500 MG/100ML
500 INJECTION, SOLUTION INTRAVENOUS
Status: DISCONTINUED | OUTPATIENT
Start: 2025-04-20 | End: 2025-04-22 | Stop reason: HOSPADM

## 2025-04-20 RX ORDER — LOPERAMIDE HYDROCHLORIDE 2 MG/1
4 CAPSULE ORAL
Status: COMPLETED | OUTPATIENT
Start: 2025-04-20 | End: 2025-04-20

## 2025-04-20 RX ORDER — ONDANSETRON HYDROCHLORIDE 2 MG/ML
4 INJECTION, SOLUTION INTRAVENOUS EVERY 8 HOURS PRN
Status: DISCONTINUED | OUTPATIENT
Start: 2025-04-20 | End: 2025-04-22 | Stop reason: HOSPADM

## 2025-04-20 RX ORDER — ONDANSETRON HYDROCHLORIDE 2 MG/ML
4 INJECTION, SOLUTION INTRAVENOUS
Status: COMPLETED | OUTPATIENT
Start: 2025-04-20 | End: 2025-04-20

## 2025-04-20 RX ORDER — PROCHLORPERAZINE EDISYLATE 5 MG/ML
5 INJECTION INTRAMUSCULAR; INTRAVENOUS EVERY 6 HOURS PRN
Status: DISCONTINUED | OUTPATIENT
Start: 2025-04-20 | End: 2025-04-22 | Stop reason: HOSPADM

## 2025-04-20 RX ORDER — SODIUM CHLORIDE, SODIUM LACTATE, POTASSIUM CHLORIDE, CALCIUM CHLORIDE 600; 310; 30; 20 MG/100ML; MG/100ML; MG/100ML; MG/100ML
INJECTION, SOLUTION INTRAVENOUS CONTINUOUS
Status: DISCONTINUED | OUTPATIENT
Start: 2025-04-20 | End: 2025-04-22

## 2025-04-20 RX ORDER — TALC
6 POWDER (GRAM) TOPICAL NIGHTLY PRN
Status: DISCONTINUED | OUTPATIENT
Start: 2025-04-20 | End: 2025-04-22 | Stop reason: HOSPADM

## 2025-04-20 RX ORDER — MORPHINE SULFATE 4 MG/ML
4 INJECTION, SOLUTION INTRAMUSCULAR; INTRAVENOUS EVERY 4 HOURS PRN
Refills: 0 | Status: DISCONTINUED | OUTPATIENT
Start: 2025-04-20 | End: 2025-04-22 | Stop reason: HOSPADM

## 2025-04-20 RX ORDER — SODIUM CHLORIDE 0.9 % (FLUSH) 0.9 %
10 SYRINGE (ML) INJECTION
Status: DISCONTINUED | OUTPATIENT
Start: 2025-04-20 | End: 2025-04-22 | Stop reason: HOSPADM

## 2025-04-20 RX ORDER — METRONIDAZOLE 500 MG/100ML
500 INJECTION, SOLUTION INTRAVENOUS
Status: COMPLETED | OUTPATIENT
Start: 2025-04-20 | End: 2025-04-20

## 2025-04-20 RX ORDER — MORPHINE SULFATE 4 MG/ML
4 INJECTION, SOLUTION INTRAMUSCULAR; INTRAVENOUS
Refills: 0 | Status: COMPLETED | OUTPATIENT
Start: 2025-04-20 | End: 2025-04-20

## 2025-04-20 RX ORDER — OXYCODONE HYDROCHLORIDE 5 MG/1
5 TABLET ORAL EVERY 4 HOURS PRN
Refills: 0 | Status: DISCONTINUED | OUTPATIENT
Start: 2025-04-20 | End: 2025-04-22 | Stop reason: HOSPADM

## 2025-04-20 RX ORDER — SODIUM CHLORIDE 9 MG/ML
INJECTION, SOLUTION INTRAVENOUS CONTINUOUS
Status: DISCONTINUED | OUTPATIENT
Start: 2025-04-20 | End: 2025-04-20

## 2025-04-20 RX ORDER — POTASSIUM CHLORIDE 7.45 MG/ML
10 INJECTION INTRAVENOUS
Status: COMPLETED | OUTPATIENT
Start: 2025-04-20 | End: 2025-04-20

## 2025-04-20 RX ADMIN — CIPROFLOXACIN 400 MG: 2 INJECTION, SOLUTION INTRAVENOUS at 05:04

## 2025-04-20 RX ADMIN — LOPERAMIDE HYDROCHLORIDE 4 MG: 2 CAPSULE ORAL at 07:04

## 2025-04-20 RX ADMIN — KETOROLAC TROMETHAMINE 10 MG: 30 INJECTION, SOLUTION INTRAMUSCULAR; INTRAVENOUS at 07:04

## 2025-04-20 RX ADMIN — IOHEXOL 75 ML: 350 INJECTION, SOLUTION INTRAVENOUS at 03:04

## 2025-04-20 RX ADMIN — METRONIDAZOLE 500 MG: 5 INJECTION, SOLUTION INTRAVENOUS at 07:04

## 2025-04-20 RX ADMIN — OXYCODONE HYDROCHLORIDE 5 MG: 5 TABLET ORAL at 07:04

## 2025-04-20 RX ADMIN — SODIUM CHLORIDE, POTASSIUM CHLORIDE, SODIUM LACTATE AND CALCIUM CHLORIDE: 600; 310; 30; 20 INJECTION, SOLUTION INTRAVENOUS at 11:04

## 2025-04-20 RX ADMIN — SODIUM CHLORIDE 1000 ML: 9 INJECTION, SOLUTION INTRAVENOUS at 02:04

## 2025-04-20 RX ADMIN — METRONIDAZOLE 500 MG: 5 INJECTION, SOLUTION INTRAVENOUS at 11:04

## 2025-04-20 RX ADMIN — ONDANSETRON 4 MG: 2 INJECTION INTRAMUSCULAR; INTRAVENOUS at 02:04

## 2025-04-20 RX ADMIN — MORPHINE SULFATE 4 MG: 4 INJECTION INTRAVENOUS at 02:04

## 2025-04-20 RX ADMIN — METRONIDAZOLE 500 MG: 5 INJECTION, SOLUTION INTRAVENOUS at 04:04

## 2025-04-20 RX ADMIN — ONDANSETRON 4 MG: 2 INJECTION INTRAMUSCULAR; INTRAVENOUS at 06:04

## 2025-04-20 RX ADMIN — SODIUM CHLORIDE: 9 INJECTION, SOLUTION INTRAVENOUS at 07:04

## 2025-04-20 RX ADMIN — ACETAMINOPHEN 1000 MG: 500 TABLET ORAL at 01:04

## 2025-04-20 RX ADMIN — POTASSIUM CHLORIDE 10 MEQ: 7.46 INJECTION, SOLUTION INTRAVENOUS at 02:04

## 2025-04-20 RX ADMIN — SODIUM CHLORIDE, POTASSIUM CHLORIDE, SODIUM LACTATE AND CALCIUM CHLORIDE: 600; 310; 30; 20 INJECTION, SOLUTION INTRAVENOUS at 09:04

## 2025-04-20 NOTE — ED PROVIDER NOTES
Encounter Date: 4/20/2025    SCRIBE #1 NOTE: I, Reg Bull, am scribing for, and in the presence of,  Dylan Unger MD. I have scribed the following portions of the note - Other sections scribed: HPI, ROS, PE.       History     Chief Complaint   Patient presents with    Abdominal Pain     RLQ pain 9/10 for 1 day, endorses fever and chills at home, also nausea and diarrhea.  Previous ABD surgeries       This is a 34 y.o. female with history of endometriosis who presents with complaint of progressively worsening abdominal pain since yesterday morning. She describes the pain as cramping and it is focused in her right lower quadrant. She reports at least 10 episodes of diarrhea, as well as intensifying pain with these bowel movements.  She also has had nausea all day and started vomiting soon after ED arrival.  She started her menstrual cycle yesterday but notes that this pain is more severe than her menstrual cramps. She also reports fever (102) and chills. She also does not normally experience diarrhea or fever during her cycle. Her periods are regular and her pain has been improved since starting OCPs. She denies eating any unusual foods and has low concern for food poisoning. She denies dysuria, urinary frequency, vaginal discharge, or concern for STD. She denies cough or cold symptoms. Her surgical history includes cholecystomy. This is the extent of the patient's complaints at this time.    The history is provided by the patient. No  was used.     Review of patient's allergies indicates:  No Known Allergies  Past Medical History:   Diagnosis Date    Allergy      Past Surgical History:   Procedure Laterality Date    GALLBLADDER SURGERY  2008    laproscopy  2007     Family History   Problem Relation Name Age of Onset    Breast cancer Maternal Grandmother      Hypertension Mother      Osteoporosis Mother      Breast cancer Other  62        anut on dads side     Social History[1]  Review  of Systems   Constitutional:  Positive for chills and fever.   HENT:  Negative for congestion.    Eyes:  Negative for redness.   Respiratory:  Negative for cough and shortness of breath.    Cardiovascular:  Negative for chest pain.   Gastrointestinal:  Positive for abdominal pain, diarrhea, nausea and vomiting.   Genitourinary:  Negative for dysuria, frequency and vaginal discharge.   Skin:  Negative for rash.   Neurological:  Negative for headaches.   Psychiatric/Behavioral:  Negative for confusion.        Physical Exam     Initial Vitals   BP Pulse Resp Temp SpO2   04/20/25 0200 04/20/25 0121 04/20/25 0121 04/20/25 0121 04/20/25 0121   139/68 103 19 98.6 °F (37 °C) 100 %      MAP       --                Physical Exam    Constitutional: She appears well-developed and well-nourished. She is not diaphoretic. No distress.   HENT:   Head: Normocephalic and atraumatic.   Dry mucous membranes   Eyes: Conjunctivae are normal.   Neck: Neck supple.   Cardiovascular:  Regular rhythm, S1 normal, S2 normal and intact distal pulses.   Tachycardia present.         2/6 systolic murmur   Pulmonary/Chest: Breath sounds normal. No respiratory distress. She has no wheezes. She has no rhonchi. She has no rales.   Abdominal: Abdomen is soft. There is abdominal tenderness.   Diffuse right lower quadrant tenderness There is no rebound and no guarding.   Musculoskeletal:         General: No edema.      Cervical back: Neck supple.     Neurological: She is alert and oriented to person, place, and time.   Skin: Skin is warm and dry.   Psychiatric: She has a normal mood and affect.         ED Course   Procedures  Labs Reviewed   COMPREHENSIVE METABOLIC PANEL - Abnormal       Result Value    Sodium 134 (*)     Potassium 3.3 (*)     Chloride 106      CO2 21 (*)     Glucose 114 (*)     BUN 6      Creatinine 0.8      Calcium 8.5 (*)     Protein Total 7.2      Albumin 3.5      Bilirubin Total 0.3      ALP 93      AST 16      ALT 16      Anion Gap  7 (*)     eGFR >60     LIPASE - Abnormal    Lipase Level 63 (*)    URINALYSIS, REFLEX TO URINE CULTURE - Abnormal    Color, UA Yellow      Appearance, UA Clear      pH, UA 6.0      Spec Grav UA >=1.030 (*)     Protein, UA Trace (*)     Glucose, UA Negative      Ketones, UA Trace (*)     Bilirubin, UA 1+ (*)     Blood, UA Trace (*)     Nitrites, UA Negative      Urobilinogen, UA Negative      Leukocyte Esterase, UA Negative     CBC WITH DIFFERENTIAL - Abnormal    WBC 10.84      RBC 4.19      HGB 11.9 (*)     HCT 35.8 (*)     MCV 85      MCH 28.4      MCHC 33.2      RDW 17.0 (*)     Platelet Count 213      MPV 10.1      Nucleated RBC 0      Neut % 81.9 (*)     Lymph % 10.1 (*)     Mono % 7.4      Eos % 0.1      Basophil % 0.2      Imm Grans % 0.3      Neut # 8.89 (*)     Lymph # 1.09      Mono # 0.80      Eos # 0.01      Baso # 0.02      Imm Grans # 0.03     POCT URINE PREGNANCY - Normal    POC Preg Test, Ur Negative       Acceptable Yes     CLOSTRIDIUM DIFFICILE   CBC W/ AUTO DIFFERENTIAL    Narrative:     The following orders were created for panel order CBC auto differential.  Procedure                               Abnormality         Status                     ---------                               -----------         ------                     CBC with Differential[[id number]]       Abnormal            Final result                 Please view results for these tests on the individual orders.   GREY TOP URINE HOLD    Extra Tube Hold for add-ons.            Imaging Results              CT Abdomen Pelvis With IV Contrast NO Oral Contrast (Final result)  Result time 04/20/25 04:56:37      Final result by Adair Harris MD (04/20/25 04:56:37)                   Impression:      Diffuse colonic wall thickening with mild mucosal enhancement suggesting pancolitis.  Mildly enlarged lymph nodes in the mesentery, likely reactive.  Findings are more pronounced on the right side of the colon compared to  the left.    Prior cholecystectomy.      Electronically signed by: Adair Harris MD  Date:    04/20/2025  Time:    04:56               Narrative:    EXAMINATION:  CT ABDOMEN PELVIS WITH IV CONTRAST    CLINICAL HISTORY:  RLQ abdominal pain (Age >= 14y);    TECHNIQUE:  Low dose axial images, sagittal and coronal reformations were obtained from the lung bases to the pubic symphysis following the IV administration of 75 mL of Omnipaque 350 .  Oral contrast was not administered.    COMPARISON:  None.    FINDINGS:  Abdomen:    - Lower thorax:Unremarkable.    - Lung bases: No infiltrates and no nodules.    - Liver: No focal mass.    - Gallbladder: Surgically absent.    - Bile Ducts: No evidence of intra or extra hepatic biliary ductal dilation.    - Spleen: Negative.    - Kidneys: No mass or hydronephrosis.    - Adrenals: Unremarkable.    - Pancreas: No mass or peripancreatic fat stranding.    - Retroperitoneum:  No significant adenopathy.    - Vascular: No abdominal aortic aneurysm.    - Abdominal wall:  Small fat containing umbilical hernia.    Pelvis:    No pelvic mass, adenopathy, or free fluid.    Bowel/Mesentery:    Small hiatal hernia.  No small bowel obstruction.  Diffuse colonic wall thickening with mild mucosal enhancement suggesting pancolitis.  Findings more pronounced on the right side.  Normal appendix.  Prominent lymph nodes in the mesentery, likely reactive.    Bones:  No acute osseous abnormality and no suspicious lytic or blastic lesion.                                       Medications   ciprofloxacin (CIPRO)400mg/200ml D5W IVPB 400 mg (400 mg Intravenous New Bag 4/20/25 0556)   metronidazole IVPB 500 mg (has no administration in time range)   sodium chloride 0.9% flush 10 mL (has no administration in time range)   melatonin tablet 6 mg (has no administration in time range)   oxyCODONE immediate release tablet 5 mg (has no administration in time range)   morphine injection 4 mg (has no administration  in time range)   ondansetron injection 4 mg (has no administration in time range)   prochlorperazine injection Soln 5 mg (has no administration in time range)   ciprofloxacin (CIPRO)400mg/200ml D5W IVPB 400 mg (has no administration in time range)   metronidazole IVPB 500 mg (has no administration in time range)   sodium chloride 0.9% bolus 1,000 mL 1,000 mL (0 mLs Intravenous Stopped 4/20/25 0347)   morphine injection 4 mg (4 mg Intravenous Given 4/20/25 0234)   ondansetron injection 4 mg (4 mg Intravenous Given 4/20/25 0233)   iohexoL (OMNIPAQUE 350) injection 75 mL (75 mLs Intravenous Given 4/20/25 0323)   ondansetron injection 4 mg (4 mg Intravenous Given 4/20/25 0605)     Medical Decision Making      34-year-old female with history of endometriosis presents for evaluation of worsening right lower quadrant pain.  Patient has started her cycle yesterday, in the past has had pelvic cramps associated that can also be in her right or left lower abdomen.  However the right lower quadrant cramping worsened throughout the day, with associated multiple episodes of nonbloody diarrhea, and fever with home temperature up to 102, which are not typical of her cycle.  She also reports worsening nausea with episodes of emesis upon ED arrival.  No suspected food poisoning or known sick contacts, no dysuria/vaginal discharge, no URI symptoms or other new complaints.  She has had an ovarian cyst before, has had her gallbladder out but still has her appendix.  On exam patient with mild tachycardia and appears dehydrated, with focal right lower quadrant tenderness but no acute abdomen or guarding/rebound.  Differential diagnosis includes ovarian cyst, appendicitis, mesenteric adenitis, viral gastroenteritis.      Initial labs with WBC 10.8, no anemia, CMP with mild hypokalemia but normal LFTs, and lipase borderline at 63.  UA without evidence for UTI.  CT abdomen shows diffuse colon wall thickening concerning for pain colitis,  worse on right side.  Patient denies any previous history of colitis or family history of inflammatory bowel disease.  Concern for infectious colitis, will treat empirically with Cipro/Flagyl.  On reassessment patient's pain is improved after IV morphine but she still has some nausea, I am concerned about her ability to be discharged with oral antibiotics.  Will admit to our ED observation unit for further supportive care and IV antibiotics, as well as GI consult for further recommendations.  Of note, patient was treated for strep throat with antibiotics last month, will also test for C diff.      Amount and/or Complexity of Data Reviewed  External Data Reviewed: notes.  Labs: ordered. Decision-making details documented in ED Course.  Radiology: ordered and independent interpretation performed. Decision-making details documented in ED Course.    Risk  OTC drugs.  Prescription drug management.            Scribe Attestation:   Scribe #1: I performed the above scribed service and the documentation accurately describes the services I performed. I attest to the accuracy of the note.              I, Dr. Dylan Unger, personally performed the services described in this documentation. All medical record entries made by the scribe were at my direction and in my presence.  I have reviewed the chart and agree that the record reflects my personal performance and is accurate and complete. Dylan Unger MD.                      Clinical Impression:  Final diagnoses:  [R10.31] Right lower quadrant abdominal pain (Primary)  [K52.9] Acute colitis          ED Disposition Condition    Observation                     [1]   Social History  Tobacco Use    Smoking status: Never    Smokeless tobacco: Never   Substance Use Topics    Alcohol use: Yes    Drug use: Never        Dylan Unger MD  04/20/25 0600

## 2025-04-20 NOTE — H&P
ED Observation Unit  History and Physical      Patient was placed in the EDOU at 0631 a.m 04/20/2025. I assumed care of this patient from the Main ED at the onset of my shift at 11:00 am on 04/20/2025.       History of Present Illness:  This is a 34 y.o. female with history of endometriosis who presents with complaint of progressively worsening abdominal pain since yesterday morning. She describes the pain as cramping and it is focused in her right lower quadrant. She reports at least 10 episodes of diarrhea, as well as intensifying pain with these bowel movements. She also has had nausea all day and started vomiting soon after ED arrival. She started her menstrual cycle yesterday but notes that this pain is more severe than her menstrual cramps. She also reports fever (102) and chills. She also does not normally experience diarrhea or fever during her cycle. Her periods are regular and her pain has been improved since starting OCPs. She denies eating any unusual foods and has low concern for food poisoning. She denies dysuria, urinary frequency, vaginal discharge, or concern for STD. She denies cough or cold symptoms. Her surgical history includes cholecystomy. This is the extent of the patient's complaints at this time     ED Course:  -- CBC without significant leukocytosis or anemia  -- CMP Na+ 134, K+ 3.3. Normal renal function and LFTs  -- CT shows pancolitis  -- unable to provide stool sample in ED, but recent antibiotic exposure to consider c diff.  -- started on IV cipro and flagyl. Pain controlled with narcotic analgesia and antiemetics    I reviewed the ED Provider Note dated 4/20/2025 prior to my evaluation of this patient.  I reviewed all labs and imaging performed in the Main ED, prior to patient being placed in Observation. Patient was placed in the ED Observation Unit for <principal problem not specified>.    PMHx   Past Medical History:   Diagnosis Date    Allergy       Past Surgical History:    Procedure Laterality Date    GALLBLADDER SURGERY  2008    laproscopy  2007        Family Hx   Family History   Problem Relation Name Age of Onset    Breast cancer Maternal Grandmother      Hypertension Mother      Osteoporosis Mother      Breast cancer Other  62        anut on dads side        Social Hx   Social History     Socioeconomic History    Marital status: Single   Tobacco Use    Smoking status: Never    Smokeless tobacco: Never   Substance and Sexual Activity    Alcohol use: Yes    Drug use: Never    Sexual activity: Not Currently     Partners: Male     Social Drivers of Health     Financial Resource Strain: Low Risk  (9/16/2024)    Overall Financial Resource Strain (CARDIA)     Difficulty of Paying Living Expenses: Not hard at all   Food Insecurity: No Food Insecurity (9/16/2024)    Hunger Vital Sign     Worried About Running Out of Food in the Last Year: Never true     Ran Out of Food in the Last Year: Never true   Physical Activity: Insufficiently Active (9/16/2024)    Exercise Vital Sign     Days of Exercise per Week: 3 days     Minutes of Exercise per Session: 30 min   Stress: No Stress Concern Present (9/16/2024)    [employer] - Occupational Stress Questionnaire     Feeling of Stress : Only a little   Housing Stability: Unknown (9/16/2024)    Housing Stability Vital Sign     Unable to Pay for Housing in the Last Year: No        Vital Signs   Vitals:    04/20/25 1104 04/20/25 1200 04/20/25 1300 04/20/25 1319   BP:  136/65 120/72    Pulse: 107 102 97    Resp:       Temp:    (!) 103.8 °F (39.9 °C)   TempSrc:    Oral   SpO2: 100% 95% 96%    Weight:       Height:            Review of Systems  10 point ROS performed and negative except as stated in HPI     Physical Exam  Physical Exam  Constitutional:       General: She is not in acute distress.     Appearance: Normal appearance. She is ill-appearing. She is not toxic-appearing.   HENT:      Head: Normocephalic and  atraumatic.      Nose: Nose normal.      Mouth/Throat:      Mouth: Mucous membranes are moist.      Comments: Dry mucous membranes  Cardiovascular:      Rate and Rhythm: Regular rhythm. Tachycardia present.   Pulmonary:      Effort: Pulmonary effort is normal. No respiratory distress.   Abdominal:      General: Abdomen is flat. Bowel sounds are normal. There is no distension.      Palpations: Abdomen is soft.      Tenderness: There is generalized abdominal tenderness.   Musculoskeletal:         General: Normal range of motion.      Cervical back: Normal range of motion.   Skin:     General: Skin is warm and dry.   Neurological:      General: No focal deficit present.      Mental Status: She is alert and oriented to person, place, and time. Mental status is at baseline.   Psychiatric:         Mood and Affect: Mood normal.         Behavior: Behavior normal.         Medications:   Scheduled Meds:   ciprofloxacin  400 mg Intravenous Q12H    metroNIDAZOLE IV (PEDS and ADULTS)  500 mg Intravenous Q8H    potassium chloride  40 mEq Oral ED 1 Time    sodium chloride 0.9%  1,000 mL Intravenous ED 1 Time     Continuous Infusions:  PRN Meds:.  Current Facility-Administered Medications:     melatonin, 6 mg, Oral, Nightly PRN    morphine, 4 mg, Intravenous, Q4H PRN    ondansetron, 4 mg, Intravenous, Q8H PRN    oxyCODONE, 5 mg, Oral, Q4H PRN    prochlorperazine, 5 mg, Intravenous, Q6H PRN    sodium chloride 0.9%, 10 mL, Intravenous, PRN      Assessment/Plan:  Colitis  -- stool sample  -- continue abx  -- prn analgesia and antiemetics  -- GI consult

## 2025-04-20 NOTE — ED NOTES
04/20/25 0612   Safety Interventions   Quick Updates - Free Text NADN   Updates Patient is resting comfortably;Bed rails up - Call light within reach;Vitals stable;Denies pain   Patient Rounds bed in low position;bed wheels locked;call light in patient/parent reach;clutter free environment maintained;ID band on;placement of personal items at bedside;visualized patient

## 2025-04-20 NOTE — ED TRIAGE NOTES
Pt presents to ED complaining of 9/10 RLQ abdominal pain x1 day. Endorses fever, chills, nausea and diarrhea. Hx of previous abdominal surgeries

## 2025-04-20 NOTE — ED NOTES
04/20/25 0345   Safety Interventions   Quick Updates - Free Text Pt says she feeling better. Pain down 3/10. NADN. Updated on wating for ct scan results. Denies any needs or concerns at this time   Updates Patient is resting comfortably;Bed rails up - Call light within reach;Vitals stable   Patient Rounds bed in low position;bed wheels locked;call light in patient/parent reach;placement of personal items at bedside;ID band on;clutter free environment maintained;visualized patient

## 2025-04-20 NOTE — CONSULTS
.[facility] - Emergency Dept  Gastroenterology  Consult Note    Patient Name: Effie Govea  MRN: [id number]  Admission Date: 4/20/2025  Hospital Length of Stay: 0 days  Code Status: Full Code   Primary Care Physician: No, Primary Doctor  Principal Problem:<principal problem not specified>    Inpatient consult to Gastroenterology  Consult performed by: Zane Green MD  Consult ordered by: Dylan Unger MD        Subjective:     Chief complaint:  Abdominal pain, diarrhea    HPI:  34-year-old woman who came to the ED complaining of abdominal pain and diarrhea.  She has a long history of crampy intermittent abdominal pain, especially around the time of her menstrual cycle.  She had been having some mild crampy pain recently, then yesterday it became severe, worst in the right lower quadrant.  This was associated with multiple loose to watery bowel movements.  There was fever up to 102 and chills.  No hematochezia.  There was nausea, but no vomiting.  She denies sick contacts.  She has not been eating anything out of the ordinary.    Past medical history:  Past Medical History:   Diagnosis Date    Allergy        Past surgical history:  Past Surgical History:   Procedure Laterality Date    GALLBLADDER SURGERY  2008    laproscopy  2007       Social history:  Social History     Socioeconomic History    Marital status: Single   Tobacco Use    Smoking status: Never    Smokeless tobacco: Never   Substance and Sexual Activity    Alcohol use: Yes    Drug use: Never    Sexual activity: Not Currently     Partners: Male     Social Drivers of Health     Financial Resource Strain: Low Risk  (9/16/2024)    Overall Financial Resource Strain (CARDIA)     Difficulty of Paying Living Expenses: Not hard at all   Food Insecurity: No Food Insecurity (9/16/2024)    Hunger Vital Sign     Worried About Running Out of Food in the Last Year: Never true     Ran Out of Food in the Last Year: Never true   Physical Activity: Insufficiently Active  (9/16/2024)    Exercise Vital Sign     Days of Exercise per Week: 3 days     Minutes of Exercise per Session: 30 min   Stress: No Stress Concern Present (9/16/2024)    [employer] - Occupational Stress Questionnaire     Feeling of Stress : Only a little   Housing Stability: Unknown (9/16/2024)    Housing Stability Vital Sign     Unable to Pay for Housing in the Last Year: No       Family history:  Family History   Problem Relation Name Age of Onset    Breast cancer Maternal Grandmother      Hypertension Mother      Osteoporosis Mother      Breast cancer Other  62        anut on dads side       Medications:  Prescriptions Prior to Admission[1]    Allergies:  Review of patient's allergies indicates:  No Known Allergies    Review of systems:  CONSTITUTIONAL:  Positive for fever and chills  CARDIOVASCULAR: Negative for chest pain or palpitations.  RESPIRATORY: Negative for SOB or cough.  GASTROINTESTINAL: See HPI  GENITOURINARY: Negative for dysuria or hematuria.  MUSCULOSKELETAL: Negative for joint or muscle pain.  NEUROLOGIC: Negative for headaches  Aside from above positives, complete 10 point review of systems negative.    Objective:     Vital Signs (Most Recent):  Temp: 99.3 °F (37.4 °C) (04/20/25 1408)  Pulse: 96 (04/20/25 1500)  Resp: 20 (04/20/25 1442)  BP: 110/64 (04/20/25 1500)  SpO2: 97 % (04/20/25 1500) Vital Signs (24h Range):  Temp:  [98.6 °F (37 °C)-103.8 °F (39.9 °C)] 99.3 °F (37.4 °C)  Pulse:  [] 96  Resp:  [14-20] 20  SpO2:  [93 %-100 %] 97 %  BP: ()/(52-80) 110/64     Physical examination:  General: well developed, well nourished, ill-appearing, but in no apparent distress  HENT: NCAT, hearing grossly intact  Eyes:  anicteric sclera  Cardiovascular: Regular rate and rhythm.   Lungs: Non-labored respirations. Breath sounds equal.   Abdomen: soft, diffuse tenderness, greatest in the right lower quadrant, nondistended, hypoactive bowel sounds  Extremities: No  "C/C/E  Neuro: AA&O x 3, no tremors  Psych: Appropriate mood and affect.   Skin: No jaundice  Musculoskeletal: no deformity    Labs:  CBC:   Recent Labs   Lab 04/20/25 0218   WBC 10.84   HGB 11.9*   HCT 35.8*        CMP:   Recent Labs   Lab 04/20/25 0218   CALCIUM 8.5*   ALBUMIN 3.5   *   K 3.3*   CO2 21*      BUN 6   CREATININE 0.8   ALKPHOS 93   ALT 16   AST 16   BILITOT 0.3     Stool C. diff: No results for input(s): "CDIFFICILEAN", "CDIFFTOX" in the last 48 hours.    Imaging:    CT Impression:     Diffuse colonic wall thickening with mild mucosal enhancement suggesting pancolitis.  Mildly enlarged lymph nodes in the mesentery, likely reactive.  Findings are more pronounced on the right side of the colon compared to the left.     Prior cholecystectomy.        Assessment:   34-year-old woman with a fairly sudden onset of lower abdominal pain, diarrhea, fever and chills.  CT shows diffuse colitis.  This is most likely infectious.    Plan:   1. Continue antibiotics   2. Await stool studies  3. Pain control, IV fluids, etc.       Thank you for your consult.     Zane Green MD  Gastroenterology  [facility] - Emergency Dept        [1] (Not in a hospital admission)    "

## 2025-04-20 NOTE — Clinical Note
Diagnosis: Right lower quadrant abdominal pain [[id number]]   Future Attending Provider: NATACHA GORMAN [[id number]]   Is the patient being sent to ED Observation?: Yes

## 2025-04-21 LAB
ABSOLUTE EOSINOPHIL (OHS): 0.06 K/UL
ABSOLUTE MONOCYTE (OHS): 0.63 K/UL (ref 0.3–1)
ABSOLUTE NEUTROPHIL COUNT (OHS): 4.64 K/UL (ref 1.8–7.7)
ANION GAP (OHS): 7 MMOL/L (ref 8–16)
BASOPHILS # BLD AUTO: 0.03 K/UL
BASOPHILS NFR BLD AUTO: 0.4 %
BUN SERPL-MCNC: 4 MG/DL (ref 6–20)
C DIFF GDH STL QL: NEGATIVE
C DIFF TOX A+B STL QL IA: NEGATIVE
CALCIUM SERPL-MCNC: 7.9 MG/DL (ref 8.7–10.5)
CHLORIDE SERPL-SCNC: 111 MMOL/L (ref 95–110)
CO2 SERPL-SCNC: 18 MMOL/L (ref 23–29)
CREAT SERPL-MCNC: 0.6 MG/DL (ref 0.5–1.4)
ERYTHROCYTE [DISTWIDTH] IN BLOOD BY AUTOMATED COUNT: 16.7 % (ref 11.5–14.5)
GFR SERPLBLD CREATININE-BSD FMLA CKD-EPI: >60 ML/MIN/1.73/M2
GLUCOSE SERPL-MCNC: 86 MG/DL (ref 70–110)
HCT VFR BLD AUTO: 32.2 % (ref 37–48.5)
HGB BLD-MCNC: 10 GM/DL (ref 12–16)
IMM GRANULOCYTES # BLD AUTO: 0.02 K/UL (ref 0–0.04)
IMM GRANULOCYTES NFR BLD AUTO: 0.3 % (ref 0–0.5)
LYMPHOCYTES # BLD AUTO: 1.65 K/UL (ref 1–4.8)
MAGNESIUM SERPL-MCNC: 1.6 MG/DL (ref 1.6–2.6)
MCH RBC QN AUTO: 27.4 PG (ref 27–31)
MCHC RBC AUTO-ENTMCNC: 31.1 G/DL (ref 32–36)
MCV RBC AUTO: 88 FL (ref 82–98)
NUCLEATED RBC (/100WBC) (OHS): 0 /100 WBC
PHOSPHATE SERPL-MCNC: 3.1 MG/DL (ref 2.7–4.5)
PLATELET # BLD AUTO: 177 K/UL (ref 150–450)
PMV BLD AUTO: 10.7 FL (ref 9.2–12.9)
POTASSIUM SERPL-SCNC: 3.5 MMOL/L (ref 3.5–5.1)
RBC # BLD AUTO: 3.65 M/UL (ref 4–5.4)
RELATIVE EOSINOPHIL (OHS): 0.9 %
RELATIVE LYMPHOCYTE (OHS): 23.5 % (ref 18–48)
RELATIVE MONOCYTE (OHS): 9 % (ref 4–15)
RELATIVE NEUTROPHIL (OHS): 65.9 % (ref 38–73)
SODIUM SERPL-SCNC: 136 MMOL/L (ref 136–145)
WBC # BLD AUTO: 7.03 K/UL (ref 3.9–12.7)

## 2025-04-21 PROCEDURE — 63600175 PHARM REV CODE 636 W HCPCS: Performed by: EMERGENCY MEDICINE

## 2025-04-21 PROCEDURE — 63600175 PHARM REV CODE 636 W HCPCS: Performed by: NURSE PRACTITIONER

## 2025-04-21 PROCEDURE — 25000003 PHARM REV CODE 250: Performed by: INTERNAL MEDICINE

## 2025-04-21 PROCEDURE — 85025 COMPLETE CBC W/AUTO DIFF WBC: CPT | Performed by: NURSE PRACTITIONER

## 2025-04-21 PROCEDURE — 80048 BASIC METABOLIC PNL TOTAL CA: CPT | Performed by: NURSE PRACTITIONER

## 2025-04-21 PROCEDURE — 94761 N-INVAS EAR/PLS OXIMETRY MLT: CPT

## 2025-04-21 PROCEDURE — 84100 ASSAY OF PHOSPHORUS: CPT | Performed by: NURSE PRACTITIONER

## 2025-04-21 PROCEDURE — 36415 COLL VENOUS BLD VENIPUNCTURE: CPT | Performed by: NURSE PRACTITIONER

## 2025-04-21 PROCEDURE — G0378 HOSPITAL OBSERVATION PER HR: HCPCS

## 2025-04-21 PROCEDURE — 25000003 PHARM REV CODE 250: Performed by: EMERGENCY MEDICINE

## 2025-04-21 PROCEDURE — 83735 ASSAY OF MAGNESIUM: CPT | Performed by: NURSE PRACTITIONER

## 2025-04-21 RX ADMIN — METRONIDAZOLE 500 MG: 5 INJECTION, SOLUTION INTRAVENOUS at 11:04

## 2025-04-21 RX ADMIN — CIPROFLOXACIN 400 MG: 2 INJECTION, SOLUTION INTRAVENOUS at 05:04

## 2025-04-21 RX ADMIN — OXYCODONE HYDROCHLORIDE 5 MG: 5 TABLET ORAL at 08:04

## 2025-04-21 RX ADMIN — PROCHLORPERAZINE EDISYLATE 5 MG: 5 INJECTION INTRAMUSCULAR; INTRAVENOUS at 08:04

## 2025-04-21 RX ADMIN — OXYCODONE HYDROCHLORIDE 5 MG: 5 TABLET ORAL at 05:04

## 2025-04-21 RX ADMIN — METRONIDAZOLE 500 MG: 5 INJECTION, SOLUTION INTRAVENOUS at 08:04

## 2025-04-21 RX ADMIN — SODIUM CHLORIDE, POTASSIUM CHLORIDE, SODIUM LACTATE AND CALCIUM CHLORIDE: 600; 310; 30; 20 INJECTION, SOLUTION INTRAVENOUS at 08:04

## 2025-04-21 RX ADMIN — VANCOMYCIN HYDROCHLORIDE 125 MG: KIT at 05:04

## 2025-04-21 RX ADMIN — METRONIDAZOLE 500 MG: 5 INJECTION, SOLUTION INTRAVENOUS at 04:04

## 2025-04-21 RX ADMIN — VANCOMYCIN HYDROCHLORIDE 125 MG: KIT at 11:04

## 2025-04-21 NOTE — PLAN OF CARE
Patient informed GIA, she has BCBS. GIA emailed Isidoro a copy of the patient's BCBS insurance card and requested payor source be updated in epic.

## 2025-04-21 NOTE — ASSESSMENT & PLAN NOTE
Patient presenting with N/V and abdominal pain. CT revealed diffuse colonic wall thickening with mild mucosal enhancement suggesting pancolitis.     -started on IV cipro & flagyl  -GI consulted, appreciate recommendations  -IV prn antiemetics and pain control  -c diff pending  -IV LR  -follow CMP & replace electrolytes as needed    4/21/25 - check GI pathogen PCR      Continue current treatment pending results of c diff

## 2025-04-21 NOTE — HPI
Effie Govea is a 34 year old female with a past medical history of endometriosis who presented to ED earlier this morning. Patient states abdominal pain started yesterday and has progressively worsened. Pain is described as RLQ cramping with associated diarrhea. Patient states multiple episodes of diarrhea (around 10) with nausea and vomiting that started after arrival to the ED. Patient states she started her menstrual cycle and states pain is more severe than her menstrual cramps. Patient also reports fever 102 with chills.  CBC did not show leukocytosis, CMP with mild hypokalemia 3.3. CT showed pancolitis. Patient was started on IV cipro and flagyl and received IV pain medication with antiemetics.     Patient was placed in EDOU for further observation. Patient's fever returned to 103.5, treated with tylenol, and continued to have diarrhea. Stool studies pending. Patient was evaluated by GI, Dr. Green, and recommendations placed for continued IV antibiotics, fluids and pain control. Patient referred to hospital medicine and will be admitted for further evaluation and management.

## 2025-04-21 NOTE — SUBJECTIVE & OBJECTIVE
Interval History: feeling better. Less diarrhea. No blood in stool  Still with gen abdominal tenderness  Awaiting c-diff  Remains on cipro/flagyl      Review of Systems   Constitutional:  Negative for chills and fever.   Respiratory:  Negative for chest tightness and shortness of breath.    Cardiovascular:  Negative for chest pain, palpitations and leg swelling.   Gastrointestinal:  Positive for diarrhea. Negative for abdominal pain and blood in stool.   Genitourinary:  Negative for dysuria, flank pain and frequency.   Musculoskeletal:  Negative for arthralgias, back pain and joint swelling.   Neurological:  Negative for seizures and syncope.   Hematological:  Does not bruise/bleed easily.     Objective:     Vital Signs (Most Recent):  Temp: 98.3 °F (36.8 °C) (04/21/25 0802)  Pulse: 67 (04/21/25 0802)  Resp: 17 (04/21/25 0841)  BP: 108/66 (04/21/25 0802)  SpO2: 97 % (04/21/25 0802) Vital Signs (24h Range):  Temp:  [97.9 °F (36.6 °C)-103.8 °F (39.9 °C)] 98.3 °F (36.8 °C)  Pulse:  [] 67  Resp:  [16-20] 17  SpO2:  [95 %-100 %] 97 %  BP: ()/(53-80) 108/66     Weight: 75.3 kg (166 lb)  Body mass index is 29.41 kg/m².    Intake/Output Summary (Last 24 hours) at 4/21/2025 0852  Last data filed at 4/20/2025 2227  Gross per 24 hour   Intake 400 ml   Output 300 ml   Net 100 ml         Physical Exam  Constitutional:       General: She is not in acute distress.     Appearance: She is not toxic-appearing.   HENT:      Head: Normocephalic and atraumatic.   Eyes:      Conjunctiva/sclera: Conjunctivae normal.   Cardiovascular:      Rate and Rhythm: Regular rhythm.      Heart sounds: Normal heart sounds. No murmur heard.  Pulmonary:      Effort: Pulmonary effort is normal.      Breath sounds: Normal breath sounds.   Abdominal:      General: Bowel sounds are normal. There is no distension.      Palpations: Abdomen is soft.      Tenderness: There is abdominal tenderness.   Skin:     General: Skin is warm and dry.       Findings: No rash.               Significant Labs: All pertinent labs within the past 24 hours have been reviewed.  BMP:   Recent Labs   Lab 04/21/25  0321      K 3.5   *   CO2 18*   BUN 4*   CREATININE 0.6   CALCIUM 7.9*   MG 1.6     CBC:   Recent Labs   Lab 04/20/25  0218 04/21/25  0321   WBC 10.84 7.03   HGB 11.9* 10.0*   HCT 35.8* 32.2*    177       Significant Imaging: I have reviewed all pertinent imaging results/findings within the past 24 hours.

## 2025-04-21 NOTE — SUBJECTIVE & OBJECTIVE
Past Medical History:   Diagnosis Date    Allergy        Past Surgical History:   Procedure Laterality Date    GALLBLADDER SURGERY  2008    laproscopy  2007       Review of patient's allergies indicates:  No Known Allergies    No current facility-administered medications on file prior to encounter.     Current Outpatient Medications on File Prior to Encounter   Medication Sig    elagolix 150 mg Tab Take 1 tablet (150 mg) by mouth once daily.    fexofenadine-pseudoephedrine (ALLEGRA-D 24) 180-240 mg per 24 hr tablet Take 1 tablet by mouth once daily.     Family History       Problem Relation (Age of Onset)    Breast cancer Maternal Grandmother, Other (62)    Hypertension Mother    Osteoporosis Mother          Tobacco Use    Smoking status: Never    Smokeless tobacco: Never   Substance and Sexual Activity    Alcohol use: Yes    Drug use: Never    Sexual activity: Not Currently     Partners: Male     Review of Systems   Constitutional:  Negative for activity change, appetite change, chills and fever.   HENT:  Negative for congestion, sore throat and trouble swallowing.    Eyes:  Negative for photophobia and visual disturbance.   Respiratory:  Negative for cough, chest tightness and shortness of breath.    Cardiovascular:  Negative for chest pain, palpitations and leg swelling.   Gastrointestinal:  Positive for abdominal pain, diarrhea, nausea and vomiting.   Genitourinary:  Negative for dysuria, flank pain and hematuria.   Musculoskeletal:  Negative for back pain.   Neurological:  Negative for dizziness, weakness and headaches.   Psychiatric/Behavioral:  Negative for confusion.      Objective:     Vital Signs (Most Recent):  Temp: 99.1 °F (37.3 °C) (04/20/25 1906)  Pulse: 87 (04/20/25 2100)  Resp: 16 (04/20/25 2100)  BP: 111/64 (04/20/25 2100)  SpO2: 97 % (04/20/25 2100) Vital Signs (24h Range):  Temp:  [98.3 °F (36.8 °C)-103.8 °F (39.9 °C)] 99.1 °F (37.3 °C)  Pulse:  [] 87  Resp:  [14-20] 16  SpO2:  [93 %-100 %]  97 %  BP: ()/(52-80) 111/64     Weight: 72.6 kg (160 lb)  Body mass index is 28.34 kg/m².     Physical Exam  Vitals reviewed.   Constitutional:       Appearance: Normal appearance. She is normal weight.      Comments: Appears uncomfortable   HENT:      Head: Normocephalic.      Mouth/Throat:      Mouth: Mucous membranes are moist.      Pharynx: Oropharynx is clear.   Eyes:      General: Lids are normal. Gaze aligned appropriately.      Conjunctiva/sclera: Conjunctivae normal.   Cardiovascular:      Rate and Rhythm: Normal rate and regular rhythm.      Pulses: Normal pulses.      Heart sounds: Normal heart sounds.   Pulmonary:      Effort: Pulmonary effort is normal.      Breath sounds: Normal breath sounds.   Abdominal:      General: Bowel sounds are normal.      Palpations: Abdomen is soft.      Tenderness: There is generalized abdominal tenderness.   Musculoskeletal:         General: Normal range of motion.      Cervical back: Normal range of motion.   Skin:     General: Skin is warm and dry.   Neurological:      Mental Status: She is alert and oriented to person, place, and time. Mental status is at baseline.   Psychiatric:         Mood and Affect: Mood normal.                Significant Labs: All pertinent labs within the past 24 hours have been reviewed.  BMP:   Recent Labs   Lab 04/20/25 0218   *   K 3.3*      CO2 21*   BUN 6   CREATININE 0.8   CALCIUM 8.5*     CBC:   Recent Labs   Lab 04/20/25 0218   WBC 10.84   HGB 11.9*   HCT 35.8*        CMP:   Recent Labs   Lab 04/20/25 0218   *   K 3.3*      CO2 21*   BUN 6   CREATININE 0.8   CALCIUM 8.5*   ALBUMIN 3.5   BILITOT 0.3   ALKPHOS 93   AST 16   ALT 16   ANIONGAP 7*       Significant Imaging: I have reviewed all pertinent imaging results/findings within the past 24 hours.  Imaging Results              CT Abdomen Pelvis With IV Contrast NO Oral Contrast (Final result)  Result time 04/20/25 04:56:37      Final result by  Adair Harris MD (04/20/25 04:56:37)                   Impression:      Diffuse colonic wall thickening with mild mucosal enhancement suggesting pancolitis.  Mildly enlarged lymph nodes in the mesentery, likely reactive.  Findings are more pronounced on the right side of the colon compared to the left.    Prior cholecystectomy.      Electronically signed by: Adair Harris MD  Date:    04/20/2025  Time:    04:56               Narrative:    EXAMINATION:  CT ABDOMEN PELVIS WITH IV CONTRAST    CLINICAL HISTORY:  RLQ abdominal pain (Age >= 14y);    TECHNIQUE:  Low dose axial images, sagittal and coronal reformations were obtained from the lung bases to the pubic symphysis following the IV administration of 75 mL of Omnipaque 350 .  Oral contrast was not administered.    COMPARISON:  None.    FINDINGS:  Abdomen:    - Lower thorax:Unremarkable.    - Lung bases: No infiltrates and no nodules.    - Liver: No focal mass.    - Gallbladder: Surgically absent.    - Bile Ducts: No evidence of intra or extra hepatic biliary ductal dilation.    - Spleen: Negative.    - Kidneys: No mass or hydronephrosis.    - Adrenals: Unremarkable.    - Pancreas: No mass or peripancreatic fat stranding.    - Retroperitoneum:  No significant adenopathy.    - Vascular: No abdominal aortic aneurysm.    - Abdominal wall:  Small fat containing umbilical hernia.    Pelvis:    No pelvic mass, adenopathy, or free fluid.    Bowel/Mesentery:    Small hiatal hernia.  No small bowel obstruction.  Diffuse colonic wall thickening with mild mucosal enhancement suggesting pancolitis.  Findings more pronounced on the right side.  Normal appendix.  Prominent lymph nodes in the mesentery, likely reactive.    Bones:  No acute osseous abnormality and no suspicious lytic or blastic lesion.

## 2025-04-21 NOTE — H&P
[facility] Medicine  History & Physical    Patient Name: Effie Govea  MRN: [id number]  Patient Class: OP- Observation  Admission Date: 4/20/2025  Attending Physician: Herson Matthews MD   Primary Care Provider: [facility] Primary Doctor         Patient information was obtained from patient, past medical records, and ER records.     Subjective:     Principal Problem:Colitis    Chief Complaint:   Chief Complaint   Patient presents with    Abdominal Pain     RLQ pain 9/10 for 1 day, endorses fever and chills at home, also nausea and diarrhea.  Previous ABD surgeries          HPI: Effie Govea is a 34 year old female with a past medical history of endometriosis who presented to ED earlier this morning. Patient states abdominal pain started yesterday and has progressively worsened. Pain is described as RLQ cramping with associated diarrhea. Patient states multiple episodes of diarrhea (around 10) with nausea and vomiting that started after arrival to the ED. Patient states she started her menstrual cycle and states pain is more severe than her menstrual cramps. Patient also reports fever 102 with chills.  CBC did not show leukocytosis, CMP with mild hypokalemia 3.3. CT showed pancolitis. Patient was started on IV cipro and flagyl and received IV pain medication with antiemetics.     Patient was placed in EDOU for further observation. Patient's fever returned to 103.5, treated with tylenol, and continued to have diarrhea. Stool studies pending. Patient was evaluated by GI, Dr. Green, and recommendations placed for continued IV antibiotics, fluids and pain control. Patient referred to hospital medicine and will be admitted for further evaluation and management.     Past Medical History:   Diagnosis Date    Allergy        Past Surgical History:   Procedure Laterality Date    GALLBLADDER SURGERY  2008    laproscopy  2007       Review of patient's allergies indicates:  No Known Allergies    No current  facility-administered medications on file prior to encounter.     Current Outpatient Medications on File Prior to Encounter   Medication Sig    elagolix 150 mg Tab Take 1 tablet (150 mg) by mouth once daily.    fexofenadine-pseudoephedrine (ALLEGRA-D 24) 180-240 mg per 24 hr tablet Take 1 tablet by mouth once daily.     Family History       Problem Relation (Age of Onset)    Breast cancer Maternal Grandmother, Other (62)    Hypertension Mother    Osteoporosis Mother          Tobacco Use    Smoking status: Never    Smokeless tobacco: Never   Substance and Sexual Activity    Alcohol use: Yes    Drug use: Never    Sexual activity: Not Currently     Partners: Male     Review of Systems   Constitutional:  Negative for activity change, appetite change, chills and fever.   HENT:  Negative for congestion, sore throat and trouble swallowing.    Eyes:  Negative for photophobia and visual disturbance.   Respiratory:  Negative for cough, chest tightness and shortness of breath.    Cardiovascular:  Negative for chest pain, palpitations and leg swelling.   Gastrointestinal:  Positive for abdominal pain, diarrhea, nausea and vomiting.   Genitourinary:  Negative for dysuria, flank pain and hematuria.   Musculoskeletal:  Negative for back pain.   Neurological:  Negative for dizziness, weakness and headaches.   Psychiatric/Behavioral:  Negative for confusion.      Objective:     Vital Signs (Most Recent):  Temp: 99.1 °F (37.3 °C) (04/20/25 1906)  Pulse: 87 (04/20/25 2100)  Resp: 16 (04/20/25 2100)  BP: 111/64 (04/20/25 2100)  SpO2: 97 % (04/20/25 2100) Vital Signs (24h Range):  Temp:  [98.3 °F (36.8 °C)-103.8 °F (39.9 °C)] 99.1 °F (37.3 °C)  Pulse:  [] 87  Resp:  [14-20] 16  SpO2:  [93 %-100 %] 97 %  BP: ()/(52-80) 111/64     Weight: 72.6 kg (160 lb)  Body mass index is 28.34 kg/m².     Physical Exam  Vitals reviewed.   Constitutional:       Appearance: Normal appearance. She is normal weight.      Comments: Appears  uncomfortable   HENT:      Head: Normocephalic.      Mouth/Throat:      Mouth: Mucous membranes are moist.      Pharynx: Oropharynx is clear.   Eyes:      General: Lids are normal. Gaze aligned appropriately.      Conjunctiva/sclera: Conjunctivae normal.   Cardiovascular:      Rate and Rhythm: Normal rate and regular rhythm.      Pulses: Normal pulses.      Heart sounds: Normal heart sounds.   Pulmonary:      Effort: Pulmonary effort is normal.      Breath sounds: Normal breath sounds.   Abdominal:      General: Bowel sounds are normal.      Palpations: Abdomen is soft.      Tenderness: There is generalized abdominal tenderness.   Musculoskeletal:         General: Normal range of motion.      Cervical back: Normal range of motion.   Skin:     General: Skin is warm and dry.   Neurological:      Mental Status: She is alert and oriented to person, place, and time. Mental status is at baseline.   Psychiatric:         Mood and Affect: Mood normal.                Significant Labs: All pertinent labs within the past 24 hours have been reviewed.  BMP:   Recent Labs   Lab 04/20/25 0218   *   K 3.3*      CO2 21*   BUN 6   CREATININE 0.8   CALCIUM 8.5*     CBC:   Recent Labs   Lab 04/20/25 0218   WBC 10.84   HGB 11.9*   HCT 35.8*        CMP:   Recent Labs   Lab 04/20/25 0218   *   K 3.3*      CO2 21*   BUN 6   CREATININE 0.8   CALCIUM 8.5*   ALBUMIN 3.5   BILITOT 0.3   ALKPHOS 93   AST 16   ALT 16   ANIONGAP 7*       Significant Imaging: I have reviewed all pertinent imaging results/findings within the past 24 hours.  Imaging Results              CT Abdomen Pelvis With IV Contrast NO Oral Contrast (Final result)  Result time 04/20/25 04:56:37      Final result by Adair Harris MD (04/20/25 04:56:37)                   Impression:      Diffuse colonic wall thickening with mild mucosal enhancement suggesting pancolitis.  Mildly enlarged lymph nodes in the mesentery, likely reactive.   Findings are more pronounced on the right side of the colon compared to the left.    Prior cholecystectomy.      Electronically signed by: Adair Harris MD  Date:    04/20/2025  Time:    04:56               Narrative:    EXAMINATION:  CT ABDOMEN PELVIS WITH IV CONTRAST    CLINICAL HISTORY:  RLQ abdominal pain (Age >= 14y);    TECHNIQUE:  Low dose axial images, sagittal and coronal reformations were obtained from the lung bases to the pubic symphysis following the IV administration of 75 mL of Omnipaque 350 .  Oral contrast was not administered.    COMPARISON:  None.    FINDINGS:  Abdomen:    - Lower thorax:Unremarkable.    - Lung bases: No infiltrates and no nodules.    - Liver: No focal mass.    - Gallbladder: Surgically absent.    - Bile Ducts: No evidence of intra or extra hepatic biliary ductal dilation.    - Spleen: Negative.    - Kidneys: No mass or hydronephrosis.    - Adrenals: Unremarkable.    - Pancreas: No mass or peripancreatic fat stranding.    - Retroperitoneum:  No significant adenopathy.    - Vascular: No abdominal aortic aneurysm.    - Abdominal wall:  Small fat containing umbilical hernia.    Pelvis:    No pelvic mass, adenopathy, or free fluid.    Bowel/Mesentery:    Small hiatal hernia.  No small bowel obstruction.  Diffuse colonic wall thickening with mild mucosal enhancement suggesting pancolitis.  Findings more pronounced on the right side.  Normal appendix.  Prominent lymph nodes in the mesentery, likely reactive.    Bones:  No acute osseous abnormality and no suspicious lytic or blastic lesion.                                      Assessment/Plan:     Assessment & Plan  Colitis  Patient presenting with N/V and abdominal pain. CT revealed diffuse colonic wall thickening with mild mucosal enhancement suggesting pancolitis.     -started on IV cipro & flagyl  -GI consulted, appreciate recommendations  -IV prn antiemetics and pain control  -c diff pending  -IV LR  -follow CMP & replace  electrolytes as needed        VTE Risk Mitigation (From admission, onward)           Ordered     Place sequential compression device  Until discontinued         04/20/25 0631     IP VTE LOW RISK PATIENT  Once         04/20/25 0631                         On 04/20/2025, patient should be placed in hospital observation services         Mounika Rubalcava NP  Department of Hospital Medicine  [facility] - Emergency Dept

## 2025-04-21 NOTE — PROGRESS NOTES
[facility] ([place])  Gastroenterology  Progress Note    Patient Name: Effie Govea  MRN: [id number]  Admission Date: 4/20/2025  Hospital Length of Stay: 0 days  Code Status: Full Code   Attending Provider: Herson Matthews MD  Primary Care Physician: No, Primary Doctor  Principal Problem: Colitis    Subjective:     CC= colitis    Interval History:  The patient is feeling a little better this morning.  There is less abdominal pain and less diarrhea.  No gross bleeding.      Review of systems:  General: Negative for fevers, chills.  Cardiovascular:  Negative for chest pain, shortness of breath     Objective:     Vital Signs (Most Recent):  Temp: 98.3 °F (36.8 °C) (04/21/25 1134)  Pulse: (!) 53 (04/21/25 1134)  Resp: 16 (04/21/25 1134)  BP: 115/65 (04/21/25 1134)  SpO2: 97 % (04/21/25 1134) Vital Signs (24h Range):  Temp:  [97.9 °F (36.6 °C)-103.8 °F (39.9 °C)] 98.3 °F (36.8 °C)  Pulse:  [] 53  Resp:  [16-20] 16  SpO2:  [95 %-99 %] 97 %  BP: ()/(53-74) 115/65     Physical examination:  GEN: Well developed, well nourished in no apparent distress   Cardiovascular: Regular rate and rhythm. No murmurs appreciated.   Chest: Non-labored respirations. Breath sounds equal   Abdomen: Soft, mild diffuse tenderness, less than yesterday, no distention, no guarding or rebound, normal bowel sounds  Psych: Appropriate mood and affect.   Extermities: No C/C/E.       CBC:   Recent Labs   Lab 04/20/25 0218 04/21/25  0321   WBC 10.84 7.03   HGB 11.9* 10.0*   HCT 35.8* 32.2*    177     CMP:   Recent Labs   Lab 04/20/25 0218 04/21/25  0321   CALCIUM 8.5* 7.9*   ALBUMIN 3.5  --    * 136   K 3.3* 3.5   CO2 21* 18*    111*   BUN 6 4*   CREATININE 0.8 0.6   ALKPHOS 93  --    ALT 16  --    AST 16  --    BILITOT 0.3  --            Assessment:   34-year-old woman with a fairly sudden onset of lower abdominal pain, diarrhea, fever and chills. CT shows diffuse colitis. This is most likely infectious.  Symptoms  starting to improve.  Stool studies pending.    Plan:   1. Await results for C difficile and GI pathogen panel  2. Continue antibiotics   3. Supportive care      Zane Green MD  Gastroenterology  [facility] ([place])

## 2025-04-21 NOTE — PLAN OF CARE
Case Management Assessment     PCP: No PCP  Pharmacy: Bedside    Patient Arrived From: Home  Existing Help at Home: None    Barriers to Discharge: None    Discharge Plan:    A. Home   B. Home    SW spoke with the patient via telephone to complete the initial assessment. Patient was alert and oriented. Patient stated, she's independent with all ADL's.  Patient doesn't receive home health services, no DME's, or HD. Patient's plan for discharge is to return home. Patient stated, she will drive herself home at discharge.  SW will continue to follow up as needed.          [facility] ([place])  Initial Discharge Assessment       Primary Care Provider: No, Primary Doctor    Admission Diagnosis: Colitis [K52.9]  Acute colitis [K52.9]  Right lower quadrant abdominal pain [R10.31]    Admission Date: 4/20/2025  Expected Discharge Date:     Transition of Care Barriers: None    Payor: /     No emergency contact information on file.    Discharge Plan A: Home  Discharge Plan B: Home      [id number] [place], [place] - [address] [address]  Phone: [phone] Fax: [phone]    [id number] [place], [place] - [address] [facility]  [address] [facility] [address]  Phone: [phone] Fax: [phone]      Initial Assessment (most recent)       Adult Discharge Assessment - 04/21/25 0940          Discharge Assessment    Assessment Type Discharge Planning Assessment     Confirmed/corrected address, phone number and insurance Yes     Confirmed Demographics Contacted registration to update     Source of Information patient     When was your last doctors appointment? --   patient doesn't have a PCP    Does patient/caregiver understand observation status Yes     Communicated DUNIA with patient/caregiver Date not available/Unable to determine     Reason For Admission Colitis     People in Home alone     Facility Arrived From: home      Do you expect to return to your current living situation? Yes     Do you have help at home or someone to help you manage your care at home? No     Prior to hospitilization cognitive status: Alert/Oriented     Current cognitive status: Alert/Oriented     Walking or Climbing Stairs Difficulty no     Dressing/Bathing Difficulty no     Equipment Currently Used at Home none     Readmission within 30 days? No     Patient currently being followed by outpatient case management? No     Do you currently have service(s) that help you manage your care at home? No     Do you take prescription medications? Yes     Do you have prescription coverage? Yes     Coverage BCBS     Do you have any problems affording any of your prescribed medications? No     Is the patient taking medications as prescribed? yes     How do you get to doctors appointments? car, drives self     Are you on dialysis? No     Do you take coumadin? No     Discharge Plan A Home     Discharge Plan B Home     DME Needed Upon Discharge  none     Discharge Plan discussed with: Patient     Transition of Care Barriers None

## 2025-04-21 NOTE — PLAN OF CARE
Problem: Adult Inpatient Plan of Care  Goal: Plan of Care Review  Outcome: Progressing     VIRTUAL NURSE:  Cued into patient's room.  Permission received per patient to turn camera to view patient.  Introduced as VN for night shift that will be working with floor nurse and nursing assistant.  Educated patient on VN's role in patient care.  Plan of care reviewed with patient.  Education per flowsheet.   Informed patient that staff will round on them every 2 hours but to use call light for any other needs they may have; informed of fall risk and fall precautions.  Patient verbalized understanding.  Call light within reach; bed siderails up x2.  Opportunity given for questions and questions answered.  Admission assessment questions answered.  Patient denies complaints or any needs at this time.  Instructed to call for assistance.  Will cont to monitor and intervene as needed.    Labs, notes, orders, and careplan initiated.       04/20/25 2223   Admission Complete   Admission Complete by VN Complete        04/20/25 2223   Patient Request   Patient Requested no compaints or needs currently   Admission   Initial VN Admission Questions Complete   Communication Issues? Technical Issue   Shift   Virtual Nurse - Rounding Complete   Pain Management Interventions pain management plan reviewed with patient/caregiver   Virtual Nurse - Patient Verbalized Approval Of Camera Use;VN Rounding   Type of Frequent Check   Type Patient Rounds   Safety/Activity   Patient Rounds bed in low position;placement of personal items at bedside;bed wheels locked;call light in patient/parent reach;visualized patient;clutter free environment maintained   Safety Promotion/Fall Prevention assistive device/personal item within reach;commode/urinal/bedpan at bedside;high risk medications identified;Fall Risk reviewed with patient/family;diversional activities provided;medications reviewed;nonskid shoes/socks when out of bed;room near unit  station;supervised activity;Supervised toileting - stay within arms reach;instructed to call staff for mobility;side rails raised x 2   Safety Precautions emergency equipment at bedside   Positioning   Body Position neutral body alignment;neutral head position;position changed independently   Head of Bed (HOB) Positioning HOB at 60-90 degrees   Pain/Comfort/Sleep   Preferred Pain Scale number (Numeric Rating Pain Scale)   Comfort/Acceptable Pain Level 2   Pain Rating (0-10): Rest 2   POSS ([place]ero Opioid-Induced Sed Scale) 1 - Awake and alert   Sleep/Rest/Relaxation no problem identified;awake

## 2025-04-21 NOTE — PLAN OF CARE
POC reviewed  with the patient. AOX4 VSS on RA. All due medications given as per MAR. Complained of abdominal pain, PRN  medication given.  Still  having  loose  stool, sample  sent  to  the laboratory. Purposeful rounding done. Safety precautions maintained.     Problem: Adult Inpatient Plan of Care  Goal: Plan of Care Review  Outcome: Progressing  Goal: Patient-Specific Goal (Individualized)  Outcome: Progressing  Goal: Absence of Hospital-Acquired Illness or Injury  Outcome: Progressing  Goal: Optimal Comfort and Wellbeing  Outcome: Progressing  Goal: Readiness for Transition of Care  Outcome: Progressing     Problem: Bowel Disease, Inflammatory (Ulcerative Colitis or Crohn's Disease)  Goal: Optimal Adaptation to Chronic Illness  Outcome: Progressing  Goal: Diarrhea Symptom Relief  Outcome: Progressing  Goal: Absence of Infection Signs and Symptoms  Outcome: Progressing  Goal: Optimal Nutrition Delivery  Outcome: Progressing  Goal: Optimal Pain Control and Function  Outcome: Progressing     Problem: Pain Acute  Goal: Optimal Pain Control and Function  Outcome: Progressing

## 2025-04-21 NOTE — PROGRESS NOTES
ED Observation Unit  Progress Note      HPI   This is a 34 y.o. female with history of endometriosis who presents with complaint of progressively worsening abdominal pain since yesterday morning. She describes the pain as cramping and it is focused in her right lower quadrant. She reports at least 10 episodes of diarrhea, as well as intensifying pain with these bowel movements. She also has had nausea all day and started vomiting soon after ED arrival. She started her menstrual cycle yesterday but notes that this pain is more severe than her menstrual cramps. She also reports fever (102) and chills. She also does not normally experience diarrhea or fever during her cycle. Her periods are regular and her pain has been improved since starting OCPs. She denies eating any unusual foods and has low concern for food poisoning. She denies dysuria, urinary frequency, vaginal discharge, or concern for STD. She denies cough or cold symptoms. Her surgical history includes cholecystomy. This is the extent of the patient's complaints at this time      ED Course:  -- CBC without significant leukocytosis or anemia  -- CMP Na+ 134, K+ 3.3. Normal renal function and LFTs  -- CT shows pancolitis  -- unable to provide stool sample in ED, but recent antibiotic exposure to consider c diff.  -- started on IV cipro and flagyl. Pain controlled with narcotic analgesia and antiemetics     I reviewed the ED Provider Note dated 4/20/2025 prior to my evaluation of this patient.  I reviewed all labs and imaging performed in the Main ED, prior to patient being placed in Observation. Patient was placed in the ED Observation Unit for colitis.    Interval History   1319: Patient spiked a fever of 103.5 and was treated with Tylenol and IV fluids as she has poor oral intake. She had an episode of diarrhea here in the ED but did not collect the sample as instructed.  Reports pain of 6/10 requiring another dose of morphine    1400: stool sample  obtained. Patient continues to have diarrhea. She attempted p.o liquid diet but had small episode of emesis    1500: GI Dr. Saleh saw the patient. Recommends continuing IV abx, following stool cultures, and managing pain    1900: Pain continues to be well controlled without further need for IV pain meds. Still having diarrhea, will treat with loperamide.    PMHx   Past Medical History:   Diagnosis Date    Allergy       Past Surgical History:   Procedure Laterality Date    GALLBLADDER SURGERY  2008    laproscopy  2007        Family Hx   Family History   Problem Relation Name Age of Onset    Breast cancer Maternal Grandmother      Hypertension Mother      Osteoporosis Mother      Breast cancer Other  62        anut on dads side        Social Hx   Social History     Socioeconomic History    Marital status: Single   Tobacco Use    Smoking status: Never    Smokeless tobacco: Never   Substance and Sexual Activity    Alcohol use: Yes    Drug use: Never    Sexual activity: Not Currently     Partners: Male     Social Drivers of Health     Financial Resource Strain: Low Risk  (9/16/2024)    Overall Financial Resource Strain (CARDIA)     Difficulty of Paying Living Expenses: Not hard at all   Food Insecurity: No Food Insecurity (9/16/2024)    Hunger Vital Sign     Worried About Running Out of Food in the Last Year: Never true     Ran Out of Food in the Last Year: Never true   Physical Activity: Insufficiently Active (9/16/2024)    Exercise Vital Sign     Days of Exercise per Week: 3 days     Minutes of Exercise per Session: 30 min   Stress: No Stress Concern Present (9/16/2024)    [employer] [place] [employer] - Occupational Stress Questionnaire     Feeling of Stress : Only a little   Housing Stability: Unknown (9/16/2024)    Housing Stability Vital Sign     Unable to Pay for Housing in the Last Year: No        Vital Signs   Vitals:    04/20/25 1700 04/20/25 1800 04/20/25 1900 04/20/25 1906   BP: (!) 94/55 (!) 96/53  (!) 99/54    Pulse: 82 83 89 89   Resp:    16   Temp:    99.1 °F (37.3 °C)   TempSrc:       SpO2: 95% 97% 98% 99%   Weight:       Height:            Labs/Imaging   Labs Reviewed   COMPREHENSIVE METABOLIC PANEL - Abnormal       Result Value    Sodium 134 (*)     Potassium 3.3 (*)     Chloride 106      CO2 21 (*)     Glucose 114 (*)     BUN 6      Creatinine 0.8      Calcium 8.5 (*)     Protein Total 7.2      Albumin 3.5      Bilirubin Total 0.3      ALP 93      AST 16      ALT 16      Anion Gap 7 (*)     eGFR >60     LIPASE - Abnormal    Lipase Level 63 (*)    URINALYSIS, REFLEX TO URINE CULTURE - Abnormal    Color, UA Yellow      Appearance, UA Clear      pH, UA 6.0      Spec Grav UA >=1.030 (*)     Protein, UA Trace (*)     Glucose, UA Negative      Ketones, UA Trace (*)     Bilirubin, UA 1+ (*)     Blood, UA Trace (*)     Nitrites, UA Negative      Urobilinogen, UA Negative      Leukocyte Esterase, UA Negative     CBC WITH DIFFERENTIAL - Abnormal    WBC 10.84      RBC 4.19      HGB 11.9 (*)     HCT 35.8 (*)     MCV 85      MCH 28.4      MCHC 33.2      RDW 17.0 (*)     Platelet Count 213      MPV 10.1      Nucleated RBC 0      Neut % 81.9 (*)     Lymph % 10.1 (*)     Mono % 7.4      Eos % 0.1      Basophil % 0.2      Imm Grans % 0.3      Neut # 8.89 (*)     Lymph # 1.09      Mono # 0.80      Eos # 0.01      Baso # 0.02      Imm Grans # 0.03     POCT URINE PREGNANCY - Normal    POC Preg Test, Ur Negative       Acceptable Yes     CLOSTRIDIUM DIFFICILE   CBC W/ AUTO DIFFERENTIAL    Narrative:     The following orders were created for panel order CBC auto differential.  Procedure                               Abnormality         Status                     ---------                               -----------         ------                     CBC with Differential[[id number]]       Abnormal            Final result                 Please view results for these tests on the individual orders.   GREY TOP  URINE HOLD    Extra Tube Hold for add-ons.        Imaging Results              CT Abdomen Pelvis With IV Contrast NO Oral Contrast (Final result)  Result time 04/20/25 04:56:37      Final result by Adair Harris MD (04/20/25 04:56:37)                   Impression:      Diffuse colonic wall thickening with mild mucosal enhancement suggesting pancolitis.  Mildly enlarged lymph nodes in the mesentery, likely reactive.  Findings are more pronounced on the right side of the colon compared to the left.    Prior cholecystectomy.      Electronically signed by: Adair Harris MD  Date:    04/20/2025  Time:    04:56               Narrative:    EXAMINATION:  CT ABDOMEN PELVIS WITH IV CONTRAST    CLINICAL HISTORY:  RLQ abdominal pain (Age >= 14y);    TECHNIQUE:  Low dose axial images, sagittal and coronal reformations were obtained from the lung bases to the pubic symphysis following the IV administration of 75 mL of Omnipaque 350 .  Oral contrast was not administered.    COMPARISON:  None.    FINDINGS:  Abdomen:    - Lower thorax:Unremarkable.    - Lung bases: No infiltrates and no nodules.    - Liver: No focal mass.    - Gallbladder: Surgically absent.    - Bile Ducts: No evidence of intra or extra hepatic biliary ductal dilation.    - Spleen: Negative.    - Kidneys: No mass or hydronephrosis.    - Adrenals: Unremarkable.    - Pancreas: No mass or peripancreatic fat stranding.    - Retroperitoneum:  No significant adenopathy.    - Vascular: No abdominal aortic aneurysm.    - Abdominal wall:  Small fat containing umbilical hernia.    Pelvis:    No pelvic mass, adenopathy, or free fluid.    Bowel/Mesentery:    Small hiatal hernia.  No small bowel obstruction.  Diffuse colonic wall thickening with mild mucosal enhancement suggesting pancolitis.  Findings more pronounced on the right side.  Normal appendix.  Prominent lymph nodes in the mesentery, likely reactive.    Bones:  No acute osseous abnormality and no suspicious  lytic or blastic lesion.                                       I reviewed all labs, imaging, EKGs.     Plan   Colitis  -- stool sample  -- continue abx  -- prn analgesia and antiemetics  -- GI following

## 2025-04-21 NOTE — ASSESSMENT & PLAN NOTE
Patient presenting with N/V and abdominal pain. CT revealed diffuse colonic wall thickening with mild mucosal enhancement suggesting pancolitis.     -started on IV cipro & flagyl  -GI consulted, appreciate recommendations  -IV prn antiemetics and pain control  -c diff pending  -IV LR  -follow CMP & replace electrolytes as needed

## 2025-04-21 NOTE — PROGRESS NOTES
[facility] Medicine  Progress Note    Patient Name: Effie Govea  MRN: [id number]  Patient Class: OP- Observation   Admission Date: 4/20/2025  Length of Stay: 0 days  Attending Physician: Herson Matthews MD  Primary Care Provider: Meche, Primary Doctor        Subjective     Principal Problem:Colitis        HPI:  Effie Govea is a 34 year old female with a past medical history of endometriosis who presented to ED earlier this morning. Patient states abdominal pain started yesterday and has progressively worsened. Pain is described as RLQ cramping with associated diarrhea. Patient states multiple episodes of diarrhea (around 10) with nausea and vomiting that started after arrival to the ED. Patient states she started her menstrual cycle and states pain is more severe than her menstrual cramps. Patient also reports fever 102 with chills.  CBC did not show leukocytosis, CMP with mild hypokalemia 3.3. CT showed pancolitis. Patient was started on IV cipro and flagyl and received IV pain medication with antiemetics.     Patient was placed in EDOU for further observation. Patient's fever returned to 103.5, treated with tylenol, and continued to have diarrhea. Stool studies pending. Patient was evaluated by GI, Dr. Green, and recommendations placed for continued IV antibiotics, fluids and pain control. Patient referred to hospital medicine and will be admitted for further evaluation and management.     Overview/Hospital Course:  No notes on file    Interval History: feeling better. Less diarrhea. No blood in stool  Still with gen abdominal tenderness  Awaiting c-diff  Remains on cipro/flagyl      Review of Systems   Constitutional:  Negative for chills and fever.   Respiratory:  Negative for chest tightness and shortness of breath.    Cardiovascular:  Negative for chest pain, palpitations and leg swelling.   Gastrointestinal:  Positive for diarrhea. Negative for abdominal pain and blood in stool.    Genitourinary:  Negative for dysuria, flank pain and frequency.   Musculoskeletal:  Negative for arthralgias, back pain and joint swelling.   Neurological:  Negative for seizures and syncope.   Hematological:  Does not bruise/bleed easily.     Objective:     Vital Signs (Most Recent):  Temp: 98.3 °F (36.8 °C) (04/21/25 0802)  Pulse: 67 (04/21/25 0802)  Resp: 17 (04/21/25 0841)  BP: 108/66 (04/21/25 0802)  SpO2: 97 % (04/21/25 0802) Vital Signs (24h Range):  Temp:  [97.9 °F (36.6 °C)-103.8 °F (39.9 °C)] 98.3 °F (36.8 °C)  Pulse:  [] 67  Resp:  [16-20] 17  SpO2:  [95 %-100 %] 97 %  BP: ()/(53-80) 108/66     Weight: 75.3 kg (166 lb)  Body mass index is 29.41 kg/m².    Intake/Output Summary (Last 24 hours) at 4/21/2025 0852  Last data filed at 4/20/2025 2227  Gross per 24 hour   Intake 400 ml   Output 300 ml   Net 100 ml         Physical Exam  Constitutional:       General: She is not in acute distress.     Appearance: She is not toxic-appearing.   HENT:      Head: Normocephalic and atraumatic.   Eyes:      Conjunctiva/sclera: Conjunctivae normal.   Cardiovascular:      Rate and Rhythm: Regular rhythm.      Heart sounds: Normal heart sounds. No murmur heard.  Pulmonary:      Effort: Pulmonary effort is normal.      Breath sounds: Normal breath sounds.   Abdominal:      General: Bowel sounds are normal. There is no distension.      Palpations: Abdomen is soft.      Tenderness: There is abdominal tenderness.   Skin:     General: Skin is warm and dry.      Findings: No rash.               Significant Labs: All pertinent labs within the past 24 hours have been reviewed.  BMP:   Recent Labs   Lab 04/21/25  0321      K 3.5   *   CO2 18*   BUN 4*   CREATININE 0.6   CALCIUM 7.9*   MG 1.6     CBC:   Recent Labs   Lab 04/20/25  0218 04/21/25  0321   WBC 10.84 7.03   HGB 11.9* 10.0*   HCT 35.8* 32.2*    177       Significant Imaging: I have reviewed all pertinent imaging results/findings within the  past 24 hours.      Assessment & Plan  Colitis  Patient presenting with N/V and abdominal pain. CT revealed diffuse colonic wall thickening with mild mucosal enhancement suggesting pancolitis.     -started on IV cipro & flagyl  -GI consulted, appreciate recommendations  -IV prn antiemetics and pain control  -c diff pending  -IV LR  -follow CMP & replace electrolytes as needed    4/21/25 - check GI pathogen PCR      Continue current treatment pending results of c diff  VTE Risk Mitigation (From admission, onward)           Ordered     Place sequential compression device  Until discontinued         04/20/25 0631     IP VTE LOW RISK PATIENT  Once         04/20/25 0631                    Discharge Planning   DUNIA:      Code Status: Full Code   Medical Readiness for Discharge Date:                            Herson Matthews MD  Department of Hospital Medicine   [facility] ([place])

## 2025-04-22 VITALS
WEIGHT: 166 LBS | TEMPERATURE: 100 F | RESPIRATION RATE: 16 BRPM | HEART RATE: 70 BPM | DIASTOLIC BLOOD PRESSURE: 78 MMHG | SYSTOLIC BLOOD PRESSURE: 161 MMHG | OXYGEN SATURATION: 97 % | BODY MASS INDEX: 29.41 KG/M2 | HEIGHT: 63 IN

## 2025-04-22 LAB
ABSOLUTE EOSINOPHIL (OHS): 0.06 K/UL
ABSOLUTE MONOCYTE (OHS): 0.68 K/UL (ref 0.3–1)
ABSOLUTE NEUTROPHIL COUNT (OHS): 6.48 K/UL (ref 1.8–7.7)
ALBUMIN SERPL BCP-MCNC: 2.9 G/DL (ref 3.5–5.2)
ALP SERPL-CCNC: 141 UNIT/L (ref 40–150)
ALT SERPL W/O P-5'-P-CCNC: 86 UNIT/L (ref 10–44)
ANION GAP (OHS): 12 MMOL/L (ref 8–16)
AST SERPL-CCNC: 61 UNIT/L (ref 11–45)
BASOPHILS # BLD AUTO: 0.05 K/UL
BASOPHILS NFR BLD AUTO: 0.6 %
BILIRUB SERPL-MCNC: 0.2 MG/DL (ref 0.1–1)
BUN SERPL-MCNC: 4 MG/DL (ref 6–20)
CALCIUM SERPL-MCNC: 8.6 MG/DL (ref 8.7–10.5)
CHLORIDE SERPL-SCNC: 110 MMOL/L (ref 95–110)
CO2 SERPL-SCNC: 16 MMOL/L (ref 23–29)
CREAT SERPL-MCNC: 0.7 MG/DL (ref 0.5–1.4)
ERYTHROCYTE [DISTWIDTH] IN BLOOD BY AUTOMATED COUNT: 16.6 % (ref 11.5–14.5)
GFR SERPLBLD CREATININE-BSD FMLA CKD-EPI: >60 ML/MIN/1.73/M2
GLUCOSE SERPL-MCNC: 97 MG/DL (ref 70–110)
HCT VFR BLD AUTO: 37.3 % (ref 37–48.5)
HGB BLD-MCNC: 11.8 GM/DL (ref 12–16)
IMM GRANULOCYTES # BLD AUTO: 0.03 K/UL (ref 0–0.04)
IMM GRANULOCYTES NFR BLD AUTO: 0.3 % (ref 0–0.5)
LYMPHOCYTES # BLD AUTO: 1.49 K/UL (ref 1–4.8)
MAGNESIUM SERPL-MCNC: 1.6 MG/DL (ref 1.6–2.6)
MCH RBC QN AUTO: 27.7 PG (ref 27–31)
MCHC RBC AUTO-ENTMCNC: 31.6 G/DL (ref 32–36)
MCV RBC AUTO: 88 FL (ref 82–98)
NUCLEATED RBC (/100WBC) (OHS): 0 /100 WBC
PLATELET # BLD AUTO: 209 K/UL (ref 150–450)
PMV BLD AUTO: 10.6 FL (ref 9.2–12.9)
POTASSIUM SERPL-SCNC: 3.5 MMOL/L (ref 3.5–5.1)
PROT SERPL-MCNC: 6.8 GM/DL (ref 6–8.4)
RBC # BLD AUTO: 4.26 M/UL (ref 4–5.4)
RELATIVE EOSINOPHIL (OHS): 0.7 %
RELATIVE LYMPHOCYTE (OHS): 17 % (ref 18–48)
RELATIVE MONOCYTE (OHS): 7.7 % (ref 4–15)
RELATIVE NEUTROPHIL (OHS): 73.7 % (ref 38–73)
SODIUM SERPL-SCNC: 138 MMOL/L (ref 136–145)
WBC # BLD AUTO: 8.79 K/UL (ref 3.9–12.7)

## 2025-04-22 PROCEDURE — 25000003 PHARM REV CODE 250: Performed by: INTERNAL MEDICINE

## 2025-04-22 PROCEDURE — 94761 N-INVAS EAR/PLS OXIMETRY MLT: CPT

## 2025-04-22 PROCEDURE — 63600175 PHARM REV CODE 636 W HCPCS: Performed by: EMERGENCY MEDICINE

## 2025-04-22 PROCEDURE — 25000003 PHARM REV CODE 250: Performed by: EMERGENCY MEDICINE

## 2025-04-22 PROCEDURE — G0378 HOSPITAL OBSERVATION PER HR: HCPCS

## 2025-04-22 PROCEDURE — 83735 ASSAY OF MAGNESIUM: CPT | Performed by: HOSPITALIST

## 2025-04-22 PROCEDURE — 80053 COMPREHEN METABOLIC PANEL: CPT | Performed by: HOSPITALIST

## 2025-04-22 PROCEDURE — 85025 COMPLETE CBC W/AUTO DIFF WBC: CPT | Performed by: HOSPITALIST

## 2025-04-22 PROCEDURE — 36415 COLL VENOUS BLD VENIPUNCTURE: CPT | Performed by: HOSPITALIST

## 2025-04-22 RX ORDER — L. ACIDOPHILUS/L.BULGARICUS 100MM CELL
1 GRANULES IN PACKET (EA) ORAL 2 TIMES DAILY
Qty: 14 PACKET | Refills: 0 | Status: SHIPPED | OUTPATIENT
Start: 2025-04-22 | End: 2025-04-29

## 2025-04-22 RX ORDER — CIPROFLOXACIN 500 MG/1
500 TABLET ORAL EVERY 8 HOURS
Qty: 12 TABLET | Refills: 0 | Status: SHIPPED | OUTPATIENT
Start: 2025-04-22 | End: 2025-04-26

## 2025-04-22 RX ORDER — METRONIDAZOLE 500 MG/1
500 TABLET ORAL EVERY 8 HOURS
Qty: 12 TABLET | Refills: 0 | Status: SHIPPED | OUTPATIENT
Start: 2025-04-22 | End: 2025-04-26

## 2025-04-22 RX ADMIN — OXYCODONE HYDROCHLORIDE 5 MG: 5 TABLET ORAL at 11:04

## 2025-04-22 RX ADMIN — METRONIDAZOLE 500 MG: 5 INJECTION, SOLUTION INTRAVENOUS at 08:04

## 2025-04-22 RX ADMIN — CIPROFLOXACIN 400 MG: 2 INJECTION, SOLUTION INTRAVENOUS at 05:04

## 2025-04-22 RX ADMIN — VANCOMYCIN HYDROCHLORIDE 125 MG: KIT at 05:04

## 2025-04-22 NOTE — ASSESSMENT & PLAN NOTE
-Placed in observation status  -Presented with N/V and abdominal pain.   -CT revealed diffuse colonic wall thickening with mild mucosal enhancement suggesting pancolitis.   -GI consulted and input appreciated  -C.diff negative  -Treated with iv fluids, cipro, flagyl, prn antiemetics and analgesics and has had a good clinical improvement.  She was seen and examined today and is asking to go home.  Discussed case with Dr. Mckeon and he has cleared her from GI perspective to go home.  Will complete 7 day course in total of cipro and flagyl.  Will prescribe probiotic as well.  Recommend close follow up with pcp within 1 week (for slight lft elevations and bp check) and GI within 2 weeks.

## 2025-04-22 NOTE — PLAN OF CARE
04/22/25 1256   Final Note   Assessment Type Final Discharge Note   Anticipated Discharge Disposition Home   What phone number can be called within the next 1-3 days to see how you are doing after discharge?   ([phone])   Hospital Resources/Appts/Education Provided Provided patient/caregiver with written discharge plan information;Provided education on problems/symptoms using teachback;Appointments scheduled and added to AVS   Post-Acute Status   Discharge Delays None known at this time     Case Management Final Discharge Note    Discharge Disposition: Home    New DME ordered / company name: None    Relevant SDOH / Transition of Care Barriers:  None    Person available to provide assistance at home when needed and their contact information: self    Scheduled followup appointment: PCP, GI    Referrals placed: None    Transportation: Friend      Patient and family educated on discharge services and updated on DC plan. Bedside RN notified, patient clear to discharge from Case Management Perspective.

## 2025-04-22 NOTE — PLAN OF CARE
POC reviewed.AOX4.All prescribed medicines given.Pain medicines given to alleviate the pain . Purposeful rounding done . Pt is kept in the lowest position.  Problem: Adult Inpatient Plan of Care  Goal: Plan of Care Review  Outcome: Progressing  Goal: Patient-Specific Goal (Individualized)  Outcome: Progressing  Goal: Absence of Hospital-Acquired Illness or Injury  Outcome: Progressing  Goal: Optimal Comfort and Wellbeing  Outcome: Progressing  Goal: Readiness for Transition of Care  Outcome: Progressing     Problem: Bowel Disease, Inflammatory (Ulcerative Colitis or Crohn's Disease)  Goal: Optimal Adaptation to Chronic Illness  Outcome: Progressing  Goal: Diarrhea Symptom Relief  Outcome: Progressing  Goal: Absence of Infection Signs and Symptoms  Outcome: Progressing  Goal: Optimal Nutrition Delivery  Outcome: Progressing  Goal: Optimal Pain Control and Function  Outcome: Progressing     Problem: Nausea and Vomiting  Goal: Nausea and Vomiting Relief  Outcome: Progressing     Problem: Fall Injury Risk  Goal: Absence of Fall and Fall-Related Injury  Outcome: Progressing     Problem: Infection  Goal: Absence of Infection Signs and Symptoms  Outcome: Progressing     Problem: Skin Injury Risk Increased  Goal: Skin Health and Integrity  Outcome: Progressing     Problem: Fluid Volume Deficit  Goal: Fluid Balance  Outcome: Progressing     Problem: Pain Acute  Goal: Optimal Pain Control and Function  Outcome: Progressing

## 2025-04-22 NOTE — DISCHARGE INSTRUCTIONS
Take all medications as prescribed.  Eat a bland soft diet  Follow up with your physicians as scheduled - pcp within 1 week and Gastroenterology (Dr. Mckeon) within 2 weeks.  Thank you for trusting [facility] and Dr. Chery with your care.  We are honored that you entrusted us with your healthcare needs. Your satisfaction is very important to us and we hope you have been very pleased with your experience at [facility]. After your discharge you may receive a survey asking you to rate your hospital experience. We encourage you to take the time to complete the survey as your feedback allows us to identify areas for improvement as well as recognize our staff.   We hope that you have received the very best care possible during your hospitalization at [facility], as your satisfaction is our top priority.    Our goal at [facility] is to always give you outstanding care and exceptional service. You may receive a survey from [employer] by mail, text or e-mail in the next 24-48 hours asking about the care you received with us. The survey should only take 5-10 minutes to complete and is very important to us.     Your feedback provides us with a way to recognize our staff who work tirelessly to provide the best care! Also, your responses help us learn how to improve when your experience was below our aspiration of excellence. We are always looking for ways to improve your stay. We WILL use your feedback to continue making improvements to help us provide the highest quality care. We keep your personal information and feedback confidential. We appreciate your time completing this survey and can't wait to hear from you!!!    We look forward to your continued care with us! Thanks so much for choosing [facility] for your healthcare needs!

## 2025-04-22 NOTE — PLAN OF CARE
Problem: Adult Inpatient Plan of Care  Goal: Plan of Care Review  Outcome: Progressing  Goal: Patient-Specific Goal (Individualized)  Outcome: Progressing  Goal: Absence of Hospital-Acquired Illness or Injury  Outcome: Progressing  Goal: Optimal Comfort and Wellbeing  Outcome: Progressing  Goal: Readiness for Transition of Care  Outcome: Progressing     Problem: Bowel Disease, Inflammatory (Ulcerative Colitis or Crohn's Disease)  Goal: Optimal Adaptation to Chronic Illness  Outcome: Progressing  Goal: Diarrhea Symptom Relief  Outcome: Progressing  Goal: Absence of Infection Signs and Symptoms  Outcome: Progressing  Goal: Optimal Nutrition Delivery  Outcome: Progressing  Goal: Optimal Pain Control and Function  Outcome: Progressing     Problem: Nausea and Vomiting  Goal: Nausea and Vomiting Relief  Outcome: Progressing     Problem: Fall Injury Risk  Goal: Absence of Fall and Fall-Related Injury  Outcome: Progressing     Problem: Infection  Goal: Absence of Infection Signs and Symptoms  Outcome: Progressing     Problem: Skin Injury Risk Increased  Goal: Skin Health and Integrity  Outcome: Progressing     Problem: Fluid Volume Deficit  Goal: Fluid Balance  Outcome: Progressing     Problem: Pain Acute  Goal: Optimal Pain Control and Function  Outcome: Progressing

## 2025-04-22 NOTE — PLAN OF CARE
Discharge orders noted. AVS prepared with medication list, importance of medication compliance, follow up appointments, diet, home care instructions, treatment plan, self management, and when to seek medical attention. Detailed clinical reference list attached. AVS printed and handed to patient by bedside nurse. VN reviewed discharge instructions with patient using teachback method.  Allowed time for questions, all questions answered.  Patient verbalized complete understanding of discharge instructions and voices no concerns.      Discharge instructions complete.  Bedside delivery complete.  Transport wheelchair not requested, pt waiting on family Bedside nurse notified.

## 2025-04-22 NOTE — DISCHARGE SUMMARY
[facility], [place], operated by [facility]  Discharge Summary      Patient Name: Effie Govea  MRN: [id number]  JEAN: [id number]  Patient Class: OP- Observation  Admission Date: 4/20/2025  Hospital Length of Stay: 0 days  Discharge Date and Time: No discharge date for patient encounter.  Attending Physician: Nikolai Chery MD   Discharging Provider: Nikolai Chery MD  Primary Care Provider: [facility]    Primary Care Team: Networked reference to record PCT     HPI:   Effie Govea is a 34 year old female with a past medical history of endometriosis who presented to ED earlier this morning. Patient states abdominal pain started yesterday and has progressively worsened. Pain is described as RLQ cramping with associated diarrhea. Patient states multiple episodes of diarrhea (around 10) with nausea and vomiting that started after arrival to the ED. Patient states she started her menstrual cycle and states pain is more severe than her menstrual cramps. Patient also reports fever 102 with chills.  CBC did not show leukocytosis, CMP with mild hypokalemia 3.3. CT showed pancolitis. Patient was started on IV cipro and flagyl and received IV pain medication with antiemetics.     Patient was placed in EDOU for further observation. Patient's fever returned to 103.5, treated with tylenol, and continued to have diarrhea. Stool studies pending. Patient was evaluated by GI, Dr. Green, and recommendations placed for continued IV antibiotics, fluids and pain control. Patient referred to hospital medicine and will be admitted for further evaluation and management.     Goals of Care Treatment Preferences:  Code Status: Full Code      SDOH Screening:  The patient was screened for utility difficulties, food insecurity, transport difficulties, housing insecurity, and interpersonal safety and there were no concerns identified this admission.     Consults:   Consults (From admission, onward)          Status Ordering Provider      Inpatient consult to Gastroenterology  Once        Provider:  (Not yet assigned)    Completed NATACHA GORMAN          Hospital Course By Problem:   Assessment & Plan  Colitis  -Placed in observation status  -Presented with N/V and abdominal pain.   -CT revealed diffuse colonic wall thickening with mild mucosal enhancement suggesting pancolitis.   -GI consulted and input appreciated  -C.diff negative  -Treated with iv fluids, cipro, flagyl, prn antiemetics and analgesics and has had a good clinical improvement.  She was seen and examined today and is asking to go home.  Discussed case with Dr. James and he has cleared her from GI perspective to go home.  Will complete 7 day course in total of cipro and flagyl.  Will prescribe probiotic as well.  Recommend close follow up with pcp within 1 week (for slight lft elevations and bp check) and GI within 2 weeks.    Final Active Diagnoses:    Diagnosis Date Noted POA    PRINCIPAL PROBLEM:  Colitis [K52.9] 04/20/2025 Yes      Problems Resolved During this Admission:       Discharged Condition: stable    Disposition: Home or Self Care    Follow Up:    Patient Instructions:      Ambulatory referral/consult to Gastroenterology   Standing Status: Future   Referral Priority: Routine Referral Type: Consultation   Referral Reason: Specialty Services Required   Referred to Provider: SAKINA JAMES Requested Specialty: Gastroenterology   Number of Visits Requested: 1     Notify your health care provider if you experience any of the following:  increased confusion or weakness     Notify your health care provider if you experience any of the following:  persistent dizziness, light-headedness, or visual disturbances     Notify your health care provider if you experience any of the following:  worsening rash     Notify your health care provider if you experience any of the following:  severe persistent headache     Notify your health care provider if you experience any of the  "following:  difficulty breathing or increased cough     Notify your health care provider if you experience any of the following:  severe uncontrolled pain     Notify your health care provider if you experience any of the following:  persistent nausea and vomiting or diarrhea     Notify your health care provider if you experience any of the following:  temperature >100.4     Activity as tolerated       Significant Diagnostic Studies: Labs: BMP:   Recent Labs   Lab 04/21/25 0321 04/22/25 0921    138   K 3.5 3.5   * 110   CO2 18* 16*   BUN 4* 4*   CREATININE 0.6 0.7   CALCIUM 7.9* 8.6*   MG 1.6 1.6   , CMP   Recent Labs   Lab 04/21/25 0321 04/22/25 0921    138   K 3.5 3.5   * 110   CO2 18* 16*   BUN 4* 4*   CREATININE 0.6 0.7   CALCIUM 7.9* 8.6*   ALBUMIN  --  2.9*   BILITOT  --  0.2   ALKPHOS  --  141   AST  --  61*   ALT  --  86*   ANIONGAP 7* 12   , CBC   Recent Labs   Lab 04/21/25 0321 04/22/25 0921   WBC 7.03 8.79   HGB 10.0* 11.8*   HCT 32.2* 37.3    209   , INR No results found for: "INR", "PROTIME", Lipid Panel No results found for: "CHOL", "HDL", "LDLCALC", "TRIG", "CHOLHDL", Troponin No results for input(s): "TROPONINI" in the last 168 hours., and A1C: No results for input(s): "HGBA1C" in the last 4320 hours.    Pending Diagnostic Studies:       None           Medications:  Reconciled Home Medications:      Medication List        START taking these medications      ciprofloxacin HCl 500 MG tablet  Commonly known as: CIPRO  Take 1 tablet (500 mg total) by mouth every 8 (eight) hours. for 4 days     lactobacillus acidophilus & bulgar 100 million cell packet  Commonly known as: LACTINEX  Take 1 packet (1 each total) by mouth 2 (two) times daily. for 7 days     metroNIDAZOLE 500 MG tablet  Commonly known as: FLAGYL  Take 1 tablet (500 mg total) by mouth every 8 (eight) hours. for 4 days            CONTINUE taking these medications      fexofenadine-pseudoephedrine 180-240 mg " per 24 hr tablet  Commonly known as: ALLEGRA-D 24  Take 1 tablet by mouth once daily.     ORILISSA 150 mg Tab  Generic drug: elagolix  Take 1 tablet (150 mg) by mouth once daily.              Indwelling Lines/Drains at time of discharge:   Lines/Drains/Airways       None                   Time spent on the discharge of patient: 35 minutes         Nikolai Chery MD  Department of Hospital Medicine  [facility])

## 2025-04-22 NOTE — PROGRESS NOTES
Gastroenterology Progress Note    Active Hospital Problems    *Colitis        Subjective:  Patient seen and examined.  The patient is stable this AM.  Some nausea with one episode of vomiting after dinner last night.  Tolerating liquids.  Staying hydrated.  C diff negative.      Reviews of Systems:  General:  Negative for fever or chills  Cardiovascular:  Negative for chest pain, shortness of breath, palpitations    Physical Exam    Vitals:  Vital Signs (Most Recent):  Temp: 98.9 °F (37.2 °C) (04/22/25 0753)  Pulse: 60 (04/22/25 0753)  Resp: 18 (04/22/25 0753)  BP: 136/85 (04/22/25 0753)  SpO2: (!) 94 % (04/22/25 0753) Vital Signs (24h Range):  Temp:  [98.2 °F (36.8 °C)-99 °F (37.2 °C)] 98.9 °F (37.2 °C)  Pulse:  [53-67] 60  Resp:  [15-18] 18  SpO2:  [93 %-98 %] 94 %  BP: (115-147)/(65-85) 136/85     GEN: Well developed, well nourished in no apparent distress   HENT: Normocephalic, anicteric sclera   Cardiovascular: Regular rate and rhythm. No murmurs appreciated.   Chest: Non-labored respirations. Breath sounds equal   Abdomen: soft, NTND, no masses  Psych: Appropriate mood and affect.   Extermities: No C/C/E. 2+ dorsalis pedis pulses bilaterally  Skin: No new visible or palpable lesions.      Medications/Infusions:  Current Medications[1]    Intake and Output:    Intake/Output Summary (Last 24 hours) at 4/22/2025 0822  Last data filed at 4/22/2025 0416  Gross per 24 hour   Intake 2842.75 ml   Output 300 ml   Net 2542.75 ml       Labs:     Latest Reference Range & Units 04/21/25 03:21   WBC 3.90 - 12.70 K/uL 7.03   RBC 4.00 - 5.40 M/uL 3.65 (L)   Hemoglobin 12.0 - 16.0 gm/dL 10.0 (L)   Hematocrit 37.0 - 48.5 % 32.2 (L)   MCV 82 - 98 fL 88   MCH 27.0 - 31.0 pg 27.4   MCHC 32.0 - 36.0 g/dL 31.1 (L)   RDW 11.5 - 14.5 % 16.7 (H)   Platelet Count 150 - 450 K/uL 177   (L): Data is abnormally low  (H): Data is abnormally high   Latest Reference Range & Units 04/21/25 03:21   Sodium 136 - 145 mmol/L 136   Potassium 3.5 -  5.1 mmol/L 3.5   Chloride 95 - 110 mmol/L 111 (H)   CO2 23 - 29 mmol/L 18 (L)   Anion Gap 8 - 16 mmol/L 7 (L)   BUN 6 - 20 mg/dL 4 (L)   Creatinine 0.5 - 1.4 mg/dL 0.6   eGFR >60 mL/min/1.73/m2 >60   Glucose 70 - 110 mg/dL 86   Calcium 8.7 - 10.5 mg/dL 7.9 (L)   Phosphorus Level 2.7 - 4.5 mg/dL 3.1   Magnesium  1.6 - 2.6 mg/dL 1.6   (H): Data is abnormally high  (L): Data is abnormally low   Latest Reference Range & Units 04/20/25 14:20   C. diff Antigen Negative  Negative   C difficile Toxins A+B, EIA Negative  Negative       Imaging and other studies:    No new    Assessment:    Patient is a 35 yo with abdominal pain, diarrhea and CT changes of colitis, suspect infectious etiology.  C diff negative.  GI pathogen panel pending.      Plan:     Follow up GI pathogen panel when available.  Doubt will change plan.   Would continue with abx to complete 7 days total duration of therapy.   Low residue diet.     OK for discharge today as long as patient can remain hydrated at home.  Feel improvement trend will continue.   Follow up in GI clinic as outpatient.      Discussed case with Dr. Chery.            [1]   Current Facility-Administered Medications   Medication Dose Route Frequency Provider Last Rate Last Admin    ciprofloxacin (CIPRO)400mg/200ml D5W IVPB 400 mg  400 mg Intravenous Q12H Dylan Unger MD   Stopped at 04/22/25 0615    lactated ringers infusion   Intravenous Continuous Mounika Rubalcava NP   Paused at 04/21/25 1626    melatonin tablet 6 mg  6 mg Oral Nightly PRN Dylan Unger MD        metronidazole IVPB 500 mg  500 mg Intravenous Q8H Dylan Unger MD   Stopped at 04/22/25 0048    morphine injection 4 mg  4 mg Intravenous Q4H PRN Dylan Unger MD   4 mg at 04/20/25 1442    ondansetron injection 4 mg  4 mg Intravenous Q8H PRN Dylan Unger MD   4 mg at 04/20/25 1442    oxyCODONE immediate release tablet 5 mg  5 mg Oral Q4H PRN Dylan Unger MD   5 mg  at 04/21/25 1742    prochlorperazine injection Soln 5 mg  5 mg Intravenous Q6H PRN Dylan Unger MD   5 mg at 04/21/25 2018    sodium chloride 0.9% flush 10 mL  10 mL Intravenous PRN Dylan Unger MD        vancomycin 125 mg/5 mL oral solution 125 mg  125 mg Oral Q6H Herson Matthews MD   125 mg at 04/22/25 0511

## 2025-06-12 ENCOUNTER — LAB VISIT (OUTPATIENT)
Dept: LAB | Facility: OTHER | Age: 35
End: 2025-06-12
Attending: INTERNAL MEDICINE
Payer: COMMERCIAL

## 2025-06-12 DIAGNOSIS — K21.9 GASTROESOPHAGEAL REFLUX DISEASE, UNSPECIFIED WHETHER ESOPHAGITIS PRESENT: ICD-10-CM

## 2025-06-12 DIAGNOSIS — R79.89 ELEVATED LIVER FUNCTION TESTS: ICD-10-CM

## 2025-06-12 DIAGNOSIS — K52.9 INFLAMMATORY BOWEL DISEASE: Primary | ICD-10-CM

## 2025-06-12 LAB
ALBUMIN SERPL BCP-MCNC: 3.7 G/DL (ref 3.5–5.2)
ALP SERPL-CCNC: 86 UNIT/L (ref 40–150)
ALT SERPL W/O P-5'-P-CCNC: 12 UNIT/L (ref 10–44)
AST SERPL-CCNC: 16 UNIT/L (ref 11–45)
BILIRUB DIRECT SERPL-MCNC: 0.1 MG/DL (ref 0.1–0.3)
BILIRUB SERPL-MCNC: 0.2 MG/DL (ref 0.1–1)
PROT SERPL-MCNC: 7.5 GM/DL (ref 6–8.4)

## 2025-06-12 PROCEDURE — 82040 ASSAY OF SERUM ALBUMIN: CPT

## 2025-06-12 PROCEDURE — 36415 COLL VENOUS BLD VENIPUNCTURE: CPT
